# Patient Record
Sex: MALE | Race: WHITE | NOT HISPANIC OR LATINO | Employment: FULL TIME | URBAN - METROPOLITAN AREA
[De-identification: names, ages, dates, MRNs, and addresses within clinical notes are randomized per-mention and may not be internally consistent; named-entity substitution may affect disease eponyms.]

---

## 2020-03-06 ENCOUNTER — OFFICE VISIT (OUTPATIENT)
Dept: INTERNAL MEDICINE | Facility: CLINIC | Age: 45
End: 2020-03-06

## 2020-03-06 VITALS
RESPIRATION RATE: 14 BRPM | BODY MASS INDEX: 40.37 KG/M2 | WEIGHT: 282 LBS | DIASTOLIC BLOOD PRESSURE: 94 MMHG | SYSTOLIC BLOOD PRESSURE: 154 MMHG | HEIGHT: 70 IN

## 2020-03-06 DIAGNOSIS — Z00.00 PHYSICAL EXAM: Primary | ICD-10-CM

## 2020-03-06 DIAGNOSIS — I10 ESSENTIAL HYPERTENSION: ICD-10-CM

## 2020-03-06 PROCEDURE — 99386 PREV VISIT NEW AGE 40-64: CPT | Performed by: NURSE PRACTITIONER

## 2020-03-06 RX ORDER — LOSARTAN POTASSIUM 50 MG/1
50 TABLET ORAL DAILY
Qty: 30 TABLET | Refills: 1 | Status: SHIPPED | OUTPATIENT
Start: 2020-03-06 | End: 2020-04-03 | Stop reason: SDUPTHER

## 2020-03-08 NOTE — PROGRESS NOTES
Andrew Pulido is a 45 y.o. male who presents to [facility] care and for an annual physical exam. He is concerned about elevated blood pressure.    He presents to [facility] care and due to elevated blood pressure which was noticed a recent biometric screening at [facility]. He c/o BP readings consistently >140/90 with intermittent episodes of headaches and feeling lightheaded. Denies chest pain or shortness of breath.  He is currently using a CPAP nightly for mgmt of sleep apnea which he tolerates well.       The following portions of the patient's history were reviewed and updated as appropriate: allergies, current medications, past social history and problem list.    Past Medical History:   Diagnosis Date   • Allergic rhinitis    • Anxiety     Anxiety/ Stress   • Bundle branch block     Right Bundle Branch Block on EKG   • Fatigue     Fatigue/Lethargy   • Gastroesophageal reflux    • Hiatal hernia    • History of EKG 06/21/2010   • Insomnia    • Low back pain     with sciatic component to right leg   • Low back strain    • Morbid obesity (CMS/HCC)    • Palpitation    • Rotator cuff tear, right    • Syncopal episodes     x 3 due to overheating at [facility]   • Vasovagal episode     Vasovagal episodes   • Weight loss     Intentional weight loss with Nutrasystem         Current Outpatient Medications:   •  losartan (COZAAR) 50 MG tablet, Take 1 tablet by mouth Daily., Disp: 30 tablet, Rfl: 1    Allergies   Allergen Reactions   • Aspirin        Review of Systems   Constitutional: Negative for activity change, appetite change, chills, diaphoresis, fatigue, fever and unexpected weight change.   HENT: Negative for congestion, dental problem, drooling, ear discharge, ear pain, facial swelling, hearing loss, mouth sores, nosebleeds, postnasal drip, rhinorrhea, sinus pressure, sore throat, tinnitus and trouble swallowing.    Eyes: Positive for visual disturbance (wears glasses, no visual changes).  "Negative for photophobia, pain, discharge, redness and itching.   Respiratory: Negative for apnea, cough, choking, chest tightness, shortness of breath and wheezing.    Cardiovascular: Negative for chest pain, palpitations and leg swelling.        No orthopnea, PND, OBANDO   Gastrointestinal: Positive for diarrhea. Negative for abdominal pain, blood in stool, constipation, nausea and vomiting.        C/o intermittent loose stools (thu 1x/week) since his cholecystectomy   Endocrine: Positive for polydipsia. Negative for cold intolerance, heat intolerance and polyuria.   Genitourinary: Negative for decreased urine volume, dysuria, enuresis, flank pain, frequency, hematuria and urgency.   Musculoskeletal: Positive for arthralgias and back pain. Negative for gait problem, joint swelling, myalgias, neck pain and neck stiffness.        He c/o low back pain 7/2019, xray by chiro showed DDD-his sx are well-controlled with monthly visits to chiro   Skin: Negative for color change and rash.        No hair changes, no nail changes   Allergic/Immunologic: Negative for environmental allergies, food allergies and immunocompromised state.   Neurological: Negative for dizziness, tremors, seizures, syncope, speech difficulty, weakness, light-headedness, numbness and headaches.   Hematological: Negative for adenopathy. Does not bruise/bleed easily.   Psychiatric/Behavioral: Positive for sleep disturbance (intermittent episodes of difficulty sleeping). Negative for agitation, confusion, decreased concentration, dysphoric mood and suicidal ideas. The patient is nervous/anxious (intermittently).        Objective   Vitals:    03/06/20 1058 03/06/20 1144   BP: 132/80 154/94   BP Location: Left arm Left arm   Patient Position: Sitting Sitting   Cuff Size: Adult    Resp: 14    Weight: 128 kg (282 lb)    Height: 177.8 cm (70\")      Body mass index is 40.46 kg/m².  Physical Exam   Constitutional: He is oriented to person, place, and time. He " appears well-developed and well-nourished. No distress.   HENT:   Right Ear: Hearing, tympanic membrane, external ear and ear canal normal.   Left Ear: Hearing, tympanic membrane, external ear and ear canal normal.   Nose: Right sinus exhibits no maxillary sinus tenderness and no frontal sinus tenderness. Left sinus exhibits no maxillary sinus tenderness and no frontal sinus tenderness.   Eyes: Pupils are equal, round, and reactive to light. Conjunctivae, EOM and lids are normal.   Neck: Trachea normal and phonation normal. Neck supple. Normal carotid pulses and no JVD present. Carotid bruit is not present. No thyroid mass and no thyromegaly present.   Cardiovascular: Normal rate, regular rhythm, S1 normal and S2 normal. PMI is not displaced. Exam reveals no gallop and no friction rub.   No murmur heard.  Pulses:       Carotid pulses are 2+ on the right side, and 2+ on the left side.       Radial pulses are 2+ on the right side, and 2+ on the left side.        Dorsalis pedis pulses are 2+ on the right side, and 2+ on the left side.   Pulmonary/Chest: Effort normal and breath sounds normal. He has no wheezes. He has no rhonchi. He has no rales. Chest wall is not dull to percussion.   Abdominal: Soft. Normal appearance, normal aorta and bowel sounds are normal. He exhibits no abdominal bruit and no mass. There is no hepatosplenomegaly. There is no tenderness.   Musculoskeletal: Normal range of motion. He exhibits no edema or tenderness.   Lymphadenopathy:     He has no cervical adenopathy.        Right: No supraclavicular adenopathy present.        Left: No supraclavicular adenopathy present.   Neurological: He is alert and oriented to person, place, and time. He has normal strength and normal reflexes. No cranial nerve deficit or sensory deficit. Coordination normal.   Skin: Skin is warm and dry. No rash noted.   Psychiatric: He has a normal mood and affect. His speech is normal and behavior is normal. Judgment and  thought content normal. Cognition and memory are normal. He is attentive.   Nursing note and vitals reviewed.      Assessment/Plan   Darren was seen today for establish care, hypertension and hyperglycemia.    Diagnoses and all orders for this visit:    Physical exam  -     Comprehensive Metabolic Panel  -     Lipid Panel  -     TSH  -     Urinalysis With Microscopic If Indicated (No Culture) - Urine, Clean Catch    Essential hypertension  -     losartan (COZAAR) 50 MG tablet; Take 1 tablet by mouth Daily.    Risk assessment:  He has a family hx (father) of DM2 and c/o increased thirst, check fasting labs today. Discussed benefits of exercise and weight loss to reduce risk of DM and HTN (BMI elevated at 40.5).  He also has a family hx (mother and father) of HTN with recent elevated readings, will start Losartan and monitor BP at home. Re-evaluate in 3-4 weeks.    Prevention:  He has received his annual flu vaccine. Tdap is due.  He receives annual eye exams, denies recent visual changes.

## 2020-03-13 LAB
ALBUMIN SERPL-MCNC: 4.5 G/DL (ref 3.5–5.2)
ALBUMIN/GLOB SERPL: 1.5 G/DL
ALP SERPL-CCNC: 71 U/L (ref 39–117)
ALT SERPL-CCNC: 30 U/L (ref 1–41)
APPEARANCE UR: CLEAR
AST SERPL-CCNC: 8 U/L (ref 1–40)
BILIRUB SERPL-MCNC: 0.6 MG/DL (ref 0.2–1.2)
BILIRUB UR QL STRIP: NEGATIVE
BUN SERPL-MCNC: 14 MG/DL (ref 6–20)
BUN/CREAT SERPL: 16.5 (ref 7–25)
CALCIUM SERPL-MCNC: 9.2 MG/DL (ref 8.6–10.5)
CHLORIDE SERPL-SCNC: 96 MMOL/L (ref 98–107)
CHOLEST SERPL-MCNC: 210 MG/DL (ref 0–200)
CO2 SERPL-SCNC: 22.8 MMOL/L (ref 22–29)
COLOR UR: YELLOW
CREAT SERPL-MCNC: 0.85 MG/DL (ref 0.76–1.27)
GLOBULIN SER CALC-MCNC: 3 GM/DL
GLUCOSE SERPL-MCNC: 343 MG/DL (ref 65–99)
GLUCOSE UR QL: ABNORMAL
HDLC SERPL-MCNC: 45 MG/DL (ref 40–60)
HGB UR QL STRIP: NEGATIVE
KETONES UR QL STRIP: ABNORMAL
LDLC SERPL CALC-MCNC: 145 MG/DL (ref 0–100)
LEUKOCYTE ESTERASE UR QL STRIP: NEGATIVE
NITRITE UR QL STRIP: NEGATIVE
PH UR STRIP: <=5 [PH] (ref 5–8)
POTASSIUM SERPL-SCNC: 4.3 MMOL/L (ref 3.5–5.2)
PROT SERPL-MCNC: 7.5 G/DL (ref 6–8.5)
PROT UR QL STRIP: NEGATIVE
SODIUM SERPL-SCNC: 132 MMOL/L (ref 136–145)
SP GR UR: ABNORMAL (ref 1–1.03)
TRIGL SERPL-MCNC: 102 MG/DL (ref 0–150)
TSH SERPL DL<=0.005 MIU/L-ACNC: 1.75 UIU/ML (ref 0.27–4.2)
UROBILINOGEN UR STRIP-MCNC: ABNORMAL MG/DL
VLDLC SERPL CALC-MCNC: 20.4 MG/DL

## 2020-03-13 PROCEDURE — 36415 COLL VENOUS BLD VENIPUNCTURE: CPT | Performed by: NURSE PRACTITIONER

## 2020-03-17 DIAGNOSIS — R73.09 ELEVATED GLUCOSE: Primary | ICD-10-CM

## 2020-03-18 LAB — HBA1C MFR BLD: 10.7 % (ref 4.8–5.6)

## 2020-03-20 LAB — REF LAB TEST METHOD: NORMAL

## 2020-04-03 ENCOUNTER — TELEMEDICINE (OUTPATIENT)
Dept: INTERNAL MEDICINE | Facility: CLINIC | Age: 45
End: 2020-04-03

## 2020-04-03 DIAGNOSIS — E11.9 DIABETES MELLITUS, NEW ONSET (HCC): ICD-10-CM

## 2020-04-03 DIAGNOSIS — E66.01 CLASS 3 SEVERE OBESITY WITH SERIOUS COMORBIDITY AND BODY MASS INDEX (BMI) OF 40.0 TO 44.9 IN ADULT, UNSPECIFIED OBESITY TYPE (HCC): ICD-10-CM

## 2020-04-03 DIAGNOSIS — I10 ESSENTIAL HYPERTENSION: Primary | ICD-10-CM

## 2020-04-03 DIAGNOSIS — E78.5 HYPERLIPIDEMIA, UNSPECIFIED HYPERLIPIDEMIA TYPE: ICD-10-CM

## 2020-04-03 PROCEDURE — 99213 OFFICE O/P EST LOW 20 MIN: CPT | Performed by: NURSE PRACTITIONER

## 2020-04-03 RX ORDER — METFORMIN HYDROCHLORIDE 500 MG/1
500 TABLET, EXTENDED RELEASE ORAL
Qty: 180 TABLET | Refills: 1 | Status: SHIPPED | OUTPATIENT
Start: 2020-04-03 | End: 2020-07-24 | Stop reason: SDUPTHER

## 2020-04-03 RX ORDER — LOSARTAN POTASSIUM 50 MG/1
50 TABLET ORAL DAILY
Qty: 90 TABLET | Refills: 1 | Status: SHIPPED | OUTPATIENT
Start: 2020-04-03 | End: 2020-10-04 | Stop reason: SDUPTHER

## 2020-04-03 NOTE — PROGRESS NOTES
Andrew Pulido is a 45 y.o. male who presents [facility] to follow-up on HTN and new diagnosis of HTN.    He was started on Losartan last month due to elevated blood pressure. He states he is tolerating medication well but is unsure what home BP readings have been. He continues to c/o fatigue throughout the day but believes this may have improved a little.  His recent labs showed an elevated glucose of 343 with a HgB A1c of 10.7 His sodium was also decreased at 132. He does c/o urinary frequency and fatigue but otherwise is feeling well. He does have a family hx (mom and dad) of DM2. His A1c in 2016 was 6.2.    Hypertension   This is a new problem. The current episode started more than 1 month ago. Associated symptoms include malaise/fatigue. Pertinent negatives include no chest pain, headaches, palpitations, peripheral edema or shortness of breath. Risk factors for coronary artery disease include diabetes mellitus. Past treatments include nothing. Current antihypertension treatment includes angiotensin blockers. Improvement on treatment: unsure what home readings have been. There are no compliance problems.         The following portions of the patient's history were reviewed and updated as appropriate: allergies, current medications, past social history and problem list.    Past Medical History:   Diagnosis Date   • Allergic rhinitis    • Anxiety     Anxiety/ Stress   • Bundle branch block     Right Bundle Branch Block on EKG   • Fatigue     Fatigue/Lethargy   • Gastroesophageal reflux    • Hiatal hernia    • History of EKG 06/21/2010   • Insomnia    • Low back pain     with sciatic component to right leg   • Low back strain    • Morbid obesity (CMS/HCC)    • Palpitation    • Rotator cuff tear, right    • Syncopal episodes     x 3 due to overheating at [place] [facility]   • Vasovagal episode     Vasovagal episodes   • Weight loss     Intentional weight loss with Nutrasystem          Current Outpatient Medications:   •  losartan (COZAAR) 50 MG tablet, Take 1 tablet by mouth Daily., Disp: 90 tablet, Rfl: 1  •  Blood Gluc Meter Disp-Strips device, 1 Units Daily., Disp: 1 each, Rfl: 0  •  glucose blood test strip, 1 each by Other route 2 (Two) Times a Day. Use as instructed, Disp: 100 each, Rfl: 12  •  Lancets 30G misc, 1 Units 2 (Two) Times a Day., Disp: 100 each, Rfl: 1  •  metFORMIN ER (GLUCOPHAGE-XR) 500 MG 24 hr tablet, Take 1 tablet by mouth Daily With Breakfast., Disp: 180 tablet, Rfl: 1    Allergies   Allergen Reactions   • Aspirin        Review of Systems   Constitutional: Positive for fatigue and malaise/fatigue. Negative for chills, fever and unexpected weight change.   HENT: Negative for congestion, ear pain, postnasal drip, sinus pressure, sore throat and trouble swallowing.    Eyes: Negative for visual disturbance.   Respiratory: Negative for cough, chest tightness, shortness of breath and wheezing.    Cardiovascular: Negative for chest pain, palpitations and leg swelling.   Gastrointestinal: Negative for abdominal pain, nausea and vomiting.   Genitourinary: Positive for frequency. Negative for dysuria and urgency.   Musculoskeletal: Negative for arthralgias and joint swelling.   Skin: Negative for color change.   Neurological: Negative for syncope, weakness and headaches.       Objective   There were no vitals filed for this visit.  There is no height or weight on file to calculate BMI.  Physical Exam   Constitutional: He appears well-developed and well-nourished. No distress.   HENT:   Right Ear: Hearing normal.   Left Ear: Hearing normal.   Eyes:   Wearing glasses   Psychiatric: He has a normal mood and affect. His behavior is normal. Judgment and thought content normal.       Assessment/Plan   Darren was seen today for diabetes mellitus and hypertension.    Diagnoses and all orders for this visit:    Essential hypertension  -     losartan (COZAAR) 50 MG tablet; Take 1  tablet by mouth Daily.    Diabetes mellitus, new onset (CMS/HCC)  -     metFORMIN ER (GLUCOPHAGE-XR) 500 MG 24 hr tablet; Take 1 tablet by mouth Daily With Breakfast.  -     Blood Gluc Meter Disp-Strips device; 1 Units Daily.  -     Lancets 30G misc; 1 Units 2 (Two) Times a Day.  -     glucose blood test strip; 1 each by Other route 2 (Two) Times a Day. Use as instructed    Hyperlipidemia, unspecified hyperlipidemia type    Class 3 severe obesity with serious comorbidity and body mass index (BMI) of 40.0 to 44.9 in adult, unspecified obesity type (CMS/HCC)    1. He is tolerating Losartan well but is unsure what home BP readings have been, continue medication and check BP.  2. Reviewed recent labs with patient. Discussed new diagnosis of DM2 and a low-carb, low-sugar diet along with regular exercise.  He will begin Metformin and monitor for GI side effects. He is also given a glucometer, advised to contact office with any difficulty using meter. Discussed fasting goal of <110 and 2 hour pp <140-160.  3.  with recent labs, begin statin at next appt (discussed).  4. BMI is 40.5. Discussed benefit of weight loss with patient.    History and medical judgement obtained from questionnaire and from phone conversation with patient and recent labwork. No physical examination was performed.     Approximately 25 minutes spent in reviewing chart and in conversation with patient.

## 2020-04-03 NOTE — PATIENT INSTRUCTIONS
Diabetes Basics    Diabetes (diabetes mellitus) is a long-term (chronic) disease. It occurs when the body does not properly use sugar (glucose) that is released from food after you eat.  Diabetes may be caused by one or both of these problems:  · Your pancreas does not make enough of a hormone called insulin.  · Your body does not react in a normal way to insulin that it makes.  Insulin lets sugars (glucose) go into cells in your body. This gives you energy. If you have diabetes, sugars cannot get into cells. This causes high blood sugar (hyperglycemia).  Follow these instructions at home:  How is diabetes treated?  You may need to take insulin or other diabetes medicines daily to keep your blood sugar in balance. Take your diabetes medicines every day as told by your doctor. List your diabetes medicines here:  Diabetes medicines  · Name of medicine: ______________________________  ? Amount (dose): _______________ Time (a.m./p.m.): _______________ Notes: ___________________________________  · Name of medicine: ______________________________  ? Amount (dose): _______________ Time (a.m./p.m.): _______________ Notes: ___________________________________  · Name of medicine: ______________________________  ? Amount (dose): _______________ Time (a.m./p.m.): _______________ Notes: ___________________________________  If you use insulin, you will learn how to give yourself insulin by injection. You may need to adjust the amount based on the food that you eat. List the types of insulin you use here:  Insulin  · Insulin type: ______________________________  ? Amount (dose): _______________ Time (a.m./p.m.): _______________ Notes: ___________________________________  · Insulin type: ______________________________  ? Amount (dose): _______________ Time (a.m./p.m.): _______________ Notes: ___________________________________  · Insulin type: ______________________________  ? Amount (dose): _______________ Time (a.m./p.m.):  _______________ Notes: ___________________________________  · Insulin type: ______________________________  ? Amount (dose): _______________ Time (a.m./p.m.): _______________ Notes: ___________________________________  · Insulin type: ______________________________  ? Amount (dose): _______________ Time (a.m./p.m.): _______________ Notes: ___________________________________  How do I manage my blood sugar?    Check your blood sugar levels using a blood glucose monitor as directed by your doctor.  Your doctor will set treatment goals for you. Generally, you should have these blood sugar levels:  · Before meals (preprandial):  mg/dL (4.4-7.2 mmol/L).  · After meals (postprandial): below 180 mg/dL (10 mmol/L).  · A1c level: less than 7%.  Write down the times that you will check your blood sugar levels:  Blood sugar checks  · Time: _______________ Notes: ___________________________________  · Time: _______________ Notes: ___________________________________  · Time: _______________ Notes: ___________________________________  · Time: _______________ Notes: ___________________________________  · Time: _______________ Notes: ___________________________________  · Time: _______________ Notes: ___________________________________    What do I need to know about low blood sugar?  Low blood sugar is called hypoglycemia. This is when blood sugar is at or below 70 mg/dL (3.9 mmol/L). Symptoms may include:  · Feeling:  ? Hungry.  ? Worried or nervous (anxious).  ? Sweaty and clammy.  ? Confused.  ? Dizzy.  ? Sleepy.  ? Sick to your stomach (nauseous).  · Having:  ? A fast heartbeat.  ? A headache.  ? A change in your vision.  ? Tingling or no feeling (numbness) around the mouth, lips, or tongue.  ? Jerky movements that you cannot control (seizure).  · Having trouble with:  ? Moving (coordination).  ? Sleeping.  ? Passing out (fainting).  ? Getting upset easily (irritability).  Treating low blood sugar  To treat low blood  sugar, eat or drink something sugary right away. If you can think clearly and swallow safely, follow the 15:15 rule:  · Take 15 grams of a fast-acting carb (carbohydrate). Talk with your doctor about how much you should take.  · Some fast-acting carbs are:  ? Sugar tablets (glucose pills). Take 3-4 glucose pills.  ? 6-8 pieces of hard candy.  ? 4-6 oz (120-150 mL) of fruit juice.  ? 4-6 oz (120-150 mL) of regular (not diet) soda.  ? 1 Tbsp (15 mL) honey or sugar.  · Check your blood sugar 15 minutes after you take the carb.  · If your blood sugar is still at or below 70 mg/dL (3.9 mmol/L), take 15 grams of a carb again.  · If your blood sugar does not go above 70 mg/dL (3.9 mmol/L) after 3 tries, get help right away.  · After your blood sugar goes back to normal, eat a meal or a snack within 1 hour.  Treating very low blood sugar  If your blood sugar is at or below 54 mg/dL (3 mmol/L), you have very low blood sugar (severe hypoglycemia). This is an emergency. Do not wait to see if the symptoms will go away. Get medical help right away. Call your local emergency services (911 in the [place] Do not drive yourself to the hospital.  Questions to ask your health care provider  · Do I need to meet with a diabetes educator?  · What equipment will I need to care for myself at home?  · What diabetes medicines do I need? When should I take them?  · How often do I need to check my blood sugar?  · What number can I call if I have questions?  · When is my next doctor's visit?  · Where can I find a support group for people with diabetes?  Where to find more information  · American Diabetes Association: www.diabetes.org  · American Association of Diabetes Educators: www.diabeteseducator.org/patient-resources  Contact a doctor if:  · Your blood sugar is at or above 240 mg/dL (13.3 mmol/L) for 2 days in a row.  · You have been sick or have had a fever for 2 days or more, and you are not getting better.  · You have any of these  problems for more than 6 hours:  ? You cannot eat or drink.  ? You feel sick to your stomach (nauseous).  ? You throw up (vomit).  ? You have watery poop (diarrhea).  Get help right away if:  · Your blood sugar is lower than 54 mg/dL (3 mmol/L).  · You get confused.  · You have trouble:  ? Thinking clearly.  ? Breathing.  Summary  · Diabetes (diabetes mellitus) is a long-term (chronic) disease. It occurs when the body does not properly use sugar (glucose) that is released from food after digestion.  · Take insulin and diabetes medicines as told.  · Check your blood sugar every day, as often as told.  · Keep all follow-up visits as told by your doctor. This is important.  This information is not intended to replace advice given to you by your health care provider. Make sure you discuss any questions you have with your health care provider.  Document Released: 03/22/2019 Document Revised: 06/10/2019 Document Reviewed: 03/22/2019  [employer] Interactive Patient Education © 2020 [employer]

## 2020-07-10 ENCOUNTER — OFFICE VISIT (OUTPATIENT)
Dept: INTERNAL MEDICINE | Facility: CLINIC | Age: 45
End: 2020-07-10

## 2020-07-10 DIAGNOSIS — E11.9 DIABETES MELLITUS, NEW ONSET (HCC): Primary | ICD-10-CM

## 2020-07-10 DIAGNOSIS — E78.5 HYPERLIPIDEMIA, UNSPECIFIED HYPERLIPIDEMIA TYPE: ICD-10-CM

## 2020-07-10 DIAGNOSIS — I10 ESSENTIAL HYPERTENSION: ICD-10-CM

## 2020-07-10 PROCEDURE — 99442 PR PHYS/QHP TELEPHONE EVALUATION 11-20 MIN: CPT | Performed by: NURSE PRACTITIONER

## 2020-07-10 RX ORDER — ATORVASTATIN CALCIUM 10 MG/1
10 TABLET, FILM COATED ORAL DAILY
Qty: 90 TABLET | Refills: 1 | Status: SHIPPED | OUTPATIENT
Start: 2020-07-10 | End: 2020-10-04 | Stop reason: SDUPTHER

## 2020-07-12 NOTE — PROGRESS NOTES
Andrew Pulido is a 45 y.o. male who presents for a telephone visit to f/u on DM2, HTN and hyperlipidemia.    You have chosen to receive care through a telephone visit. Do you consent to use a telephone visit for your medical care today? Yes    He was started on metformin April 3 due to an elevated A1c of 10.7.  He is tolerating metformin well.  He did noticed loose stools when starting medication which have since resolved.  Unfortunately he has not been checking his glucose and is unsure what his home readings have been.  However, he does notice improvement in fatigue and decreased episodes of dizziness.      Diabetes   Hypoglycemia symptoms include dizziness (intermittent but improved). Pertinent negatives for hypoglycemia include no headaches. Pertinent negatives for diabetes include no chest pain, no fatigue and no weakness.   Hypertension   This is a chronic problem. The current episode started more than 1 year ago. The problem is unchanged. Pertinent negatives include no chest pain, headaches, malaise/fatigue, palpitations, peripheral edema or shortness of breath. Current antihypertension treatment includes angiotensin blockers. The current treatment provides significant improvement. There are no compliance problems.    Hyperlipidemia   Pertinent negatives include no chest pain or shortness of breath.        The following portions of the patient's history were reviewed and updated as appropriate: allergies, current medications, past social history and problem list.    Past Medical History:   Diagnosis Date   • Allergic rhinitis    • Anxiety     Anxiety/ Stress   • Bundle branch block     Right Bundle Branch Block on EKG   • Fatigue     Fatigue/Lethargy   • Gastroesophageal reflux    • Hiatal hernia    • History of EKG 06/21/2010   • Insomnia    • Low back pain     with sciatic component to right leg   • Low back strain    • Morbid obesity (CMS/HCC)    • Palpitation    • Rotator cuff tear, right     • Syncopal episodes     x 3 due to overheating at [facility]   • Vasovagal episode     Vasovagal episodes   • Weight loss     Intentional weight loss with Nutrasystem         Current Outpatient Medications:   •  Blood Gluc Meter Disp-Strips device, 1 Units Daily., Disp: 1 each, Rfl: 0  •  glucose blood test strip, 1 each by Other route 2 (Two) Times a Day. Use as instructed, Disp: 100 each, Rfl: 12  •  Lancets 30G misc, 1 Units 2 (Two) Times a Day., Disp: 100 each, Rfl: 1  •  losartan (COZAAR) 50 MG tablet, Take 1 tablet by mouth Daily., Disp: 90 tablet, Rfl: 1  •  metFORMIN ER (GLUCOPHAGE-XR) 500 MG 24 hr tablet, Take 1 tablet by mouth Daily With Breakfast., Disp: 180 tablet, Rfl: 1  •  atorvastatin (LIPITOR) 10 MG tablet, Take 1 tablet by mouth Daily., Disp: 90 tablet, Rfl: 1    Allergies   Allergen Reactions   • Aspirin        Review of Systems   Constitutional: Negative for chills, fatigue, fever, malaise/fatigue and unexpected weight change.   HENT: Negative for congestion, ear pain, postnasal drip, sinus pressure, sore throat and trouble swallowing.    Eyes: Negative for visual disturbance.   Respiratory: Negative for cough, chest tightness, shortness of breath and wheezing.    Cardiovascular: Negative for chest pain, palpitations and leg swelling.   Gastrointestinal: Negative for abdominal pain, blood in stool, nausea and vomiting.   Genitourinary: Negative for dysuria, frequency and urgency.   Musculoskeletal: Negative for arthralgias and joint swelling.   Skin: Negative for color change.   Neurological: Positive for dizziness (intermittent but improved) and light-headedness. Negative for syncope, weakness and headaches.   Hematological: Does not bruise/bleed easily.       Objective   There were no vitals filed for this visit.  There is no height or weight on file to calculate BMI.  Physical Exam   Psychiatric: He has a normal mood and affect. His behavior is normal. Judgment and thought content  normal.       Assessment/Plan   Darren was seen today for diabetes, hypertension and hyperlipidemia.    Diagnoses and all orders for this visit:    Diabetes mellitus, new onset (CMS/HCC)  -     Hemoglobin A1c  -     Microalbumin / Creatinine Urine Ratio - Urine, Clean Catch    Essential hypertension  -     CBC & Differential    Hyperlipidemia, unspecified hyperlipidemia type  -     Lipid Panel  -     atorvastatin (LIPITOR) 10 MG tablet; Take 1 tablet by mouth Daily.    1.  He is tolerating metformin well but is unsure what his home glucose readings have been.  He will come in for repeat A1c.  He is asked to bring his glucometer with him so we can review this with him.    2.  He is doing well with losartan, unsure what home blood pressure readings have been.  3.  Reviewed lipid panel from March 13 which showed an LDL of 145.  He is agreeable to starting atorvastatin for LDL lowering as well as cardiovascular protection.    History and medical judgement obtained from phone conversation with patient. No physical examination was performed. Approximately 18 minutes spent in phone conversation with patient.

## 2020-07-13 ENCOUNTER — TELEPHONE (OUTPATIENT)
Dept: INTERNAL MEDICINE | Facility: CLINIC | Age: 45
End: 2020-07-13

## 2020-07-13 NOTE — TELEPHONE ENCOUNTER
"Have attempted to call the patient multiple times this morning.  Need to schedule fasting labs for this Friday, July 17, 2020.  Patient does not answer and message says that the \"box is full and cannot leave a message\" at this time.  Will try to call again.  Called  [phone]  "

## 2020-07-18 LAB
ALBUMIN/CREAT UR: 6 MG/G CREAT (ref 0–29)
BASOPHILS # BLD AUTO: 0.07 10*3/MM3 (ref 0–0.2)
BASOPHILS NFR BLD AUTO: 0.7 % (ref 0–1.5)
CHOLEST SERPL-MCNC: 169 MG/DL (ref 0–200)
CREAT UR-MCNC: 180.1 MG/DL
EOSINOPHIL # BLD AUTO: 0.11 10*3/MM3 (ref 0–0.4)
EOSINOPHIL NFR BLD AUTO: 1.1 % (ref 0.3–6.2)
ERYTHROCYTE [DISTWIDTH] IN BLOOD BY AUTOMATED COUNT: 12.2 % (ref 12.3–15.4)
HBA1C MFR BLD: 10.7 % (ref 4.8–5.6)
HCT VFR BLD AUTO: 48.7 % (ref 37.5–51)
HDLC SERPL-MCNC: 41 MG/DL (ref 40–60)
HGB BLD-MCNC: 17 G/DL (ref 13–17.7)
IMM GRANULOCYTES # BLD AUTO: 0.09 10*3/MM3 (ref 0–0.05)
IMM GRANULOCYTES NFR BLD AUTO: 0.9 % (ref 0–0.5)
LDLC SERPL CALC-MCNC: 105 MG/DL (ref 0–100)
LYMPHOCYTES # BLD AUTO: 3.03 10*3/MM3 (ref 0.7–3.1)
LYMPHOCYTES NFR BLD AUTO: 30.1 % (ref 19.6–45.3)
MCH RBC QN AUTO: 30.3 PG (ref 26.6–33)
MCHC RBC AUTO-ENTMCNC: 34.9 G/DL (ref 31.5–35.7)
MCV RBC AUTO: 86.8 FL (ref 79–97)
MICROALBUMIN UR-MCNC: 10.5 UG/ML
MONOCYTES # BLD AUTO: 0.55 10*3/MM3 (ref 0.1–0.9)
MONOCYTES NFR BLD AUTO: 5.5 % (ref 5–12)
NEUTROPHILS # BLD AUTO: 6.21 10*3/MM3 (ref 1.7–7)
NEUTROPHILS NFR BLD AUTO: 61.7 % (ref 42.7–76)
NRBC BLD AUTO-RTO: 0 /100 WBC (ref 0–0.2)
PLATELET # BLD AUTO: 283 10*3/MM3 (ref 140–450)
RBC # BLD AUTO: 5.61 10*6/MM3 (ref 4.14–5.8)
TRIGL SERPL-MCNC: 113 MG/DL (ref 0–150)
VLDLC SERPL CALC-MCNC: 22.6 MG/DL
WBC # BLD AUTO: 10.06 10*3/MM3 (ref 3.4–10.8)

## 2020-07-23 ENCOUNTER — TELEPHONE (OUTPATIENT)
Dept: INTERNAL MEDICINE | Facility: CLINIC | Age: 45
End: 2020-07-23

## 2020-07-23 NOTE — TELEPHONE ENCOUNTER
Was he given information to titrate the dosage as outlined in the result note? If not, please ask Kellee to call him with directions. Thanks.

## 2020-07-23 NOTE — TELEPHONE ENCOUNTER
----- Message from Olive Maria sent at 7/23/2020  3:46 PM EDT -----  Patient returned call.  He was told he needed his Metformin changed due to the results of his A1c.    [place] [address] [address] - [phone] PH - [phone] (Phone)  [phone] (Fax)    [phone]    Thank you,    Olive

## 2020-07-24 DIAGNOSIS — E11.9 DIABETES MELLITUS, NEW ONSET (HCC): ICD-10-CM

## 2020-07-24 RX ORDER — METFORMIN HYDROCHLORIDE 500 MG/1
500 TABLET, EXTENDED RELEASE ORAL 4 TIMES DAILY
Qty: 180 TABLET | Refills: 1 | Status: SHIPPED | OUTPATIENT
Start: 2020-07-24 | End: 2020-07-30 | Stop reason: SDUPTHER

## 2020-07-30 ENCOUNTER — TELEPHONE (OUTPATIENT)
Dept: INTERNAL MEDICINE | Facility: CLINIC | Age: 45
End: 2020-07-30

## 2020-07-30 DIAGNOSIS — E11.9 DIABETES MELLITUS, NEW ONSET (HCC): ICD-10-CM

## 2020-07-30 RX ORDER — METFORMIN HYDROCHLORIDE 500 MG/1
500 TABLET, EXTENDED RELEASE ORAL 4 TIMES DAILY
Qty: 360 TABLET | Refills: 1 | Status: SHIPPED | OUTPATIENT
Start: 2020-07-30 | End: 2020-11-15 | Stop reason: SDUPTHER

## 2020-10-04 DIAGNOSIS — I10 ESSENTIAL HYPERTENSION: ICD-10-CM

## 2020-10-04 DIAGNOSIS — E78.5 HYPERLIPIDEMIA, UNSPECIFIED HYPERLIPIDEMIA TYPE: ICD-10-CM

## 2020-10-08 NOTE — TELEPHONE ENCOUNTER
PATIENT IS CALLING IN HE IS WANTING TO KNOW WHEN THESE PRESCRIPTIONS WILL BE SENT IN, HE REQUESTED THEM ON 10/04/2020.      PLEASE ADVISE    CALLBACK NUMBER:  [phone]       HE STATES IF HE DOES NOT ANSWER CAN YOU LEAVE MESSAGE.    HE STATES HE WILL BE OUT OF [facility] TODAY.

## 2020-10-09 ENCOUNTER — OFFICE VISIT (OUTPATIENT)
Dept: INTERNAL MEDICINE | Facility: CLINIC | Age: 45
End: 2020-10-09

## 2020-10-09 VITALS
WEIGHT: 286.4 LBS | DIASTOLIC BLOOD PRESSURE: 84 MMHG | HEART RATE: 76 BPM | OXYGEN SATURATION: 95 % | HEIGHT: 70 IN | SYSTOLIC BLOOD PRESSURE: 111 MMHG | BODY MASS INDEX: 41 KG/M2

## 2020-10-09 DIAGNOSIS — Z11.59 SCREENING FOR VIRAL DISEASE: ICD-10-CM

## 2020-10-09 DIAGNOSIS — I10 ESSENTIAL HYPERTENSION: ICD-10-CM

## 2020-10-09 DIAGNOSIS — E11.9 DIABETES MELLITUS, NEW ONSET (HCC): Primary | ICD-10-CM

## 2020-10-09 DIAGNOSIS — E11.9 ENCOUNTER FOR DIABETIC FOOT EXAM (HCC): ICD-10-CM

## 2020-10-09 DIAGNOSIS — E78.5 HYPERLIPIDEMIA, UNSPECIFIED HYPERLIPIDEMIA TYPE: ICD-10-CM

## 2020-10-09 DIAGNOSIS — Z23 NEED FOR VACCINATION: ICD-10-CM

## 2020-10-09 PROCEDURE — 90686 IIV4 VACC NO PRSV 0.5 ML IM: CPT | Performed by: NURSE PRACTITIONER

## 2020-10-09 PROCEDURE — 90471 IMMUNIZATION ADMIN: CPT | Performed by: NURSE PRACTITIONER

## 2020-10-09 PROCEDURE — 99214 OFFICE O/P EST MOD 30 MIN: CPT | Performed by: NURSE PRACTITIONER

## 2020-10-09 RX ORDER — LOSARTAN POTASSIUM 50 MG/1
50 TABLET ORAL DAILY
Qty: 90 TABLET | Refills: 1 | Status: SHIPPED | OUTPATIENT
Start: 2020-10-09 | End: 2020-11-01 | Stop reason: SDUPTHER

## 2020-10-09 RX ORDER — ATORVASTATIN CALCIUM 10 MG/1
10 TABLET, FILM COATED ORAL DAILY
Qty: 90 TABLET | Refills: 1 | Status: SHIPPED | OUTPATIENT
Start: 2020-10-09 | End: 2021-01-10 | Stop reason: SDUPTHER

## 2020-10-10 LAB
BUN SERPL-MCNC: 12 MG/DL (ref 6–20)
BUN/CREAT SERPL: 15.8 (ref 7–25)
CALCIUM SERPL-MCNC: 9.3 MG/DL (ref 8.6–10.5)
CHLORIDE SERPL-SCNC: 99 MMOL/L (ref 98–107)
CO2 SERPL-SCNC: 25.3 MMOL/L (ref 22–29)
CREAT SERPL-MCNC: 0.76 MG/DL (ref 0.76–1.27)
GLUCOSE SERPL-MCNC: 258 MG/DL (ref 65–99)
HBA1C MFR BLD: 10.1 % (ref 4.8–5.6)
HCV AB S/CO SERPL IA: <0.1 S/CO RATIO (ref 0–0.9)
POTASSIUM SERPL-SCNC: 4.7 MMOL/L (ref 3.5–5.2)
SODIUM SERPL-SCNC: 136 MMOL/L (ref 136–145)

## 2020-10-11 NOTE — PROGRESS NOTES
Andrew Pulido is a 45 y.o. male who presents for f/u regarding DM2, HTN and hyperlipidemia.    His A1c 7/2020 was 10.7, he was started on Metformin which he is tolerating well. He unfortunately does not check his glucose at home (unsure how to use glucometer) and does not know what glucose readings have been.  He does report feeling better with improved energy level over the past few months. Denies chest pain or shortness of breath.    Hypertension  This is a chronic problem. The current episode started more than 1 year ago. The problem is unchanged. The problem is controlled. Pertinent negatives include no chest pain, headaches, palpitations, peripheral edema or shortness of breath. Current antihypertension treatment includes angiotensin blockers. The current treatment provides significant improvement. There are no compliance problems.    Hyperlipidemia  This is a chronic problem. The current episode started more than 1 year ago. The problem is controlled. Recent lipid tests were reviewed and are low. Exacerbating diseases include diabetes. He has no history of hypothyroidism. Pertinent negatives include no chest pain or shortness of breath. Current antihyperlipidemic treatment includes statins. The current treatment provides significant improvement of lipids. There are no compliance problems.         The following portions of the patient's history were reviewed and updated as appropriate: allergies, current medications, past social history and problem list.    Past Medical History:   Diagnosis Date   • Allergic rhinitis    • Anxiety     Anxiety/ Stress   • Bundle branch block     Right Bundle Branch Block on EKG   • Fatigue     Fatigue/Lethargy   • Gastroesophageal reflux    • Hiatal hernia    • History of EKG 06/21/2010   • Insomnia    • Low back pain     with sciatic component to right leg   • Low back strain    • Morbid obesity (CMS/HCC)    • Palpitation    • Rotator cuff tear, right    •  "Syncopal episodes     x 3 [facility]   • Vasovagal episode     Vasovagal episodes   • Weight loss     Intentional weight loss with Nutrasystem         Current Outpatient Medications:   •  atorvastatin (LIPITOR) 10 MG tablet, Take 1 tablet by mouth Daily., Disp: 90 tablet, Rfl: 1  •  Blood Gluc Meter Disp-Strips device, 1 Units Daily., Disp: 1 each, Rfl: 0  •  glucose blood test strip, 1 each by Other route 2 (Two) Times a Day. Use as instructed, Disp: 100 each, Rfl: 12  •  Lancets 30G misc, 1 Units 2 (Two) Times a Day., Disp: 100 each, Rfl: 1  •  losartan (COZAAR) 50 MG tablet, Take 1 tablet by mouth Daily., Disp: 90 tablet, Rfl: 1  •  metFORMIN ER (GLUCOPHAGE-XR) 500 MG 24 hr tablet, Take 1 tablet by mouth 4 (Four) Times a Day., Disp: 360 tablet, Rfl: 1    Allergies   Allergen Reactions   • Aspirin        Review of Systems   Constitutional: Negative for chills, fatigue, fever and unexpected weight change.   HENT: Positive for congestion and rhinorrhea. Negative for ear pain, postnasal drip, sinus pressure, sore throat and trouble swallowing.    Eyes: Negative for visual disturbance.   Respiratory: Negative for cough, chest tightness, shortness of breath and wheezing.    Cardiovascular: Negative for chest pain, palpitations and leg swelling.   Gastrointestinal: Negative for abdominal pain, blood in stool, nausea and vomiting.   Genitourinary: Negative for dysuria, frequency and urgency.   Musculoskeletal: Negative for arthralgias and joint swelling.   Skin: Negative for color change.   Neurological: Negative for syncope, weakness and headaches.   Hematological: Does not bruise/bleed easily.       Objective   Vitals:    10/09/20 0809   BP: 111/84   BP Location: Right arm   Patient Position: Sitting   Cuff Size: Adult   Pulse: 76   SpO2: 95%   Weight: 130 kg (286 lb 6.4 oz)   Height: 177.8 cm (70\")     Body mass index is 41.09 kg/m².  Physical Exam  Constitutional:       Appearance: He " is well-developed. He is not ill-appearing.   HENT:      Head: Normocephalic.      Right Ear: Hearing, tympanic membrane and external ear normal.      Left Ear: Hearing, tympanic membrane and external ear normal.      Nose: Nose normal. No nasal deformity, mucosal edema or rhinorrhea.      Right Sinus: No maxillary sinus tenderness or frontal sinus tenderness.      Left Sinus: No maxillary sinus tenderness or frontal sinus tenderness.      Mouth/Throat:      Dentition: Normal dentition.   Eyes:      General: Lids are normal.         Right eye: No discharge.         Left eye: No discharge.      Conjunctiva/sclera: Conjunctivae normal.      Right eye: No exudate.     Left eye: No exudate.  Neck:      Musculoskeletal: Normal range of motion. No edema.      Thyroid: No thyroid mass or thyromegaly.      Vascular: No carotid bruit.      Trachea: Trachea normal.   Cardiovascular:      Rate and Rhythm: Regular rhythm.      Pulses:           Dorsalis pedis pulses are 3+ on the right side and 3+ on the left side.        Posterior tibial pulses are 3+ on the right side and 3+ on the left side.      Heart sounds: Normal heart sounds. No murmur.   Pulmonary:      Effort: No respiratory distress.      Breath sounds: Normal breath sounds. No decreased breath sounds, wheezing, rhonchi or rales.   Abdominal:      General: Bowel sounds are normal.      Palpations: Abdomen is soft.      Tenderness: There is no abdominal tenderness.   Musculoskeletal:      Right foot: Normal range of motion.      Left foot: Normal range of motion.   Feet:      Right foot:      Protective Sensation: 10 sites tested. 10 sites sensed.      Skin integrity: Skin integrity normal.      Toenail Condition: Right toenails are normal.      Left foot:      Protective Sensation: 10 sites tested. 10 sites sensed.      Skin integrity: Skin integrity normal.      Toenail Condition: Left toenails are normal.   Lymphadenopathy:      Head:      Right side of head: No  submental, submandibular, tonsillar, preauricular, posterior auricular or occipital adenopathy.      Left side of head: No submental, submandibular, tonsillar, preauricular, posterior auricular or occipital adenopathy.   Skin:     General: Skin is warm and dry.      Nails: There is no clubbing.     Neurological:      Mental Status: He is alert.   Psychiatric:         Behavior: Behavior is cooperative.         Assessment/Plan   Darren was seen today for diabetes, hypertension and hyperlipidemia.    Diagnoses and all orders for this visit:    Diabetes mellitus, new onset (CMS/HCC)  -     Hemoglobin A1c    Essential hypertension  -     Basic Metabolic Panel    Hyperlipidemia, unspecified hyperlipidemia type    Screening for viral disease  -     Hepatitis C Antibody    Need for vaccination  -     Fluarix Quad >6 Months (VFC) ([id number])    Encounter for diabetic foot exam (CMS/[facility])    1. DM2-he is tolerating Metformin well, recheck A1c and titrate medication as needed. He is encouraged to bring his glucometer to his next visit so we can demonstrate proper usage.  2. HTN-BP is excellent in the office, continue current medications along with a low-sodium diet.  3. Hyperlipidemia-he is on statin therapy for cardiovascular protection which he is tolerating without myalgias.

## 2020-10-14 DIAGNOSIS — E11.9 DIABETES MELLITUS, NEW ONSET (HCC): Primary | ICD-10-CM

## 2020-10-14 RX ORDER — PIOGLITAZONEHYDROCHLORIDE 15 MG/1
15 TABLET ORAL DAILY
Qty: 90 TABLET | Refills: 1 | Status: SHIPPED | OUTPATIENT
Start: 2020-10-14 | End: 2021-01-10 | Stop reason: SDUPTHER

## 2020-11-01 DIAGNOSIS — I10 ESSENTIAL HYPERTENSION: ICD-10-CM

## 2020-11-02 RX ORDER — LOSARTAN POTASSIUM 50 MG/1
50 TABLET ORAL DAILY
Qty: 90 TABLET | Refills: 1 | Status: SHIPPED | OUTPATIENT
Start: 2020-11-02 | End: 2021-01-29 | Stop reason: SDUPTHER

## 2020-11-15 DIAGNOSIS — E11.9 DIABETES MELLITUS, NEW ONSET (HCC): ICD-10-CM

## 2020-11-16 RX ORDER — METFORMIN HYDROCHLORIDE 500 MG/1
500 TABLET, EXTENDED RELEASE ORAL 4 TIMES DAILY
Qty: 360 TABLET | Refills: 1 | Status: SHIPPED | OUTPATIENT
Start: 2020-11-16 | End: 2021-02-19 | Stop reason: SDUPTHER

## 2021-01-10 DIAGNOSIS — E11.9 DIABETES MELLITUS, NEW ONSET (HCC): ICD-10-CM

## 2021-01-10 DIAGNOSIS — E78.5 HYPERLIPIDEMIA, UNSPECIFIED HYPERLIPIDEMIA TYPE: ICD-10-CM

## 2021-01-10 RX ORDER — PIOGLITAZONEHYDROCHLORIDE 15 MG/1
15 TABLET ORAL DAILY
Qty: 90 TABLET | Refills: 1 | Status: SHIPPED | OUTPATIENT
Start: 2021-01-10 | End: 2021-04-11 | Stop reason: SDUPTHER

## 2021-01-10 RX ORDER — ATORVASTATIN CALCIUM 10 MG/1
10 TABLET, FILM COATED ORAL DAILY
Qty: 90 TABLET | Refills: 1 | Status: SHIPPED | OUTPATIENT
Start: 2021-01-10 | End: 2021-04-04 | Stop reason: SDUPTHER

## 2021-01-29 ENCOUNTER — OFFICE VISIT (OUTPATIENT)
Dept: INTERNAL MEDICINE | Facility: CLINIC | Age: 46
End: 2021-01-29

## 2021-01-29 VITALS
SYSTOLIC BLOOD PRESSURE: 124 MMHG | HEART RATE: 83 BPM | OXYGEN SATURATION: 98 % | DIASTOLIC BLOOD PRESSURE: 82 MMHG | WEIGHT: 299 LBS | TEMPERATURE: 98.3 F | BODY MASS INDEX: 42.8 KG/M2 | HEIGHT: 70 IN

## 2021-01-29 DIAGNOSIS — E78.00 HYPERCHOLESTEREMIA: ICD-10-CM

## 2021-01-29 DIAGNOSIS — E11.65 UNCONTROLLED TYPE 2 DIABETES MELLITUS WITH HYPERGLYCEMIA (HCC): Primary | ICD-10-CM

## 2021-01-29 DIAGNOSIS — I10 ESSENTIAL HYPERTENSION: ICD-10-CM

## 2021-01-29 DIAGNOSIS — E66.01 MORBIDLY OBESE (HCC): ICD-10-CM

## 2021-01-29 LAB
ALBUMIN SERPL-MCNC: 4.4 G/DL (ref 3.5–5.2)
ALBUMIN/GLOB SERPL: 1.6 G/DL
ALP SERPL-CCNC: 52 U/L (ref 39–117)
ALT SERPL-CCNC: 31 U/L (ref 1–41)
AST SERPL-CCNC: 22 U/L (ref 1–40)
BILIRUB SERPL-MCNC: 0.5 MG/DL (ref 0–1.2)
BUN SERPL-MCNC: 15 MG/DL (ref 6–20)
BUN/CREAT SERPL: 18.3 (ref 7–25)
CALCIUM SERPL-MCNC: 9.6 MG/DL (ref 8.6–10.5)
CHLORIDE SERPL-SCNC: 101 MMOL/L (ref 98–107)
CHOLEST SERPL-MCNC: 157 MG/DL (ref 0–200)
CO2 SERPL-SCNC: 26.4 MMOL/L (ref 22–29)
CREAT SERPL-MCNC: 0.82 MG/DL (ref 0.76–1.27)
GLOBULIN SER CALC-MCNC: 2.7 GM/DL
GLUCOSE SERPL-MCNC: 174 MG/DL (ref 65–99)
HBA1C MFR BLD: 9.5 % (ref 4.8–5.6)
HDLC SERPL-MCNC: 47 MG/DL (ref 40–60)
LDLC SERPL CALC-MCNC: 90 MG/DL (ref 0–100)
POTASSIUM SERPL-SCNC: 4.6 MMOL/L (ref 3.5–5.2)
PROT SERPL-MCNC: 7.1 G/DL (ref 6–8.5)
SODIUM SERPL-SCNC: 139 MMOL/L (ref 136–145)
TRIGL SERPL-MCNC: 109 MG/DL (ref 0–150)
VLDLC SERPL CALC-MCNC: 20 MG/DL (ref 5–40)

## 2021-01-29 PROCEDURE — 99214 OFFICE O/P EST MOD 30 MIN: CPT | Performed by: NURSE PRACTITIONER

## 2021-01-29 RX ORDER — LOSARTAN POTASSIUM 50 MG/1
50 TABLET ORAL DAILY
Qty: 90 TABLET | Refills: 2 | Status: SHIPPED | OUTPATIENT
Start: 2021-01-29 | End: 2021-01-30 | Stop reason: SDUPTHER

## 2021-01-29 NOTE — PROGRESS NOTES
"Chief Complaint  Diabetes, Hypertension, and Hyperlipidemia    Subjective          Darren Pulido presents to [facility] for f/u regarding DM2, HTN and hyperlipidemia.    He was started on Actos 10/2020 due to elevated A1c (10.1) which he is tolerating well, does not check glucose at home so he is unaware what his readings have been.  He was started on Atorvastatin for cardiovascular protection which he is tolerating well, denies myalgias.    Hypertension  This is a recurrent problem. The current episode started more than 1 year ago. The problem has been gradually improving since onset. The problem is controlled. Associated symptoms include malaise/fatigue, shortness of breath and sweats. Pertinent negatives include no anxiety, blurred vision, chest pain, headaches, neck pain, orthopnea, palpitations, peripheral edema or PND. There are no associated agents to hypertension. Risk factors for coronary artery disease include diabetes mellitus and obesity. Compliance problems include diet and exercise.        Objective   Vital Signs:   /82 (BP Location: Left arm, Patient Position: Sitting, Cuff Size: Adult)   Pulse 83   Temp 98.3 °F (36.8 °C) (Oral)   Ht 177.8 cm (70\")   Wt 136 kg (299 lb)   SpO2 98%   BMI 42.90 kg/m²     Physical Exam  Constitutional:       Appearance: He is well-developed. He is not ill-appearing.   HENT:      Head: Normocephalic.      Right Ear: Hearing, tympanic membrane and external ear normal.      Left Ear: Hearing, tympanic membrane and external ear normal.      Nose: Nose normal. No nasal deformity, mucosal edema or rhinorrhea.      Right Sinus: No maxillary sinus tenderness or frontal sinus tenderness.      Left Sinus: No maxillary sinus tenderness or frontal sinus tenderness.      Mouth/Throat:      Dentition: Normal dentition.   Eyes:      General: Lids are normal.         Right eye: No discharge.         Left eye: No discharge.      " Conjunctiva/sclera: Conjunctivae normal.      Right eye: No exudate.     Left eye: No exudate.  Neck:      Musculoskeletal: Normal range of motion. No edema.      Thyroid: No thyroid mass or thyromegaly.      Vascular: No carotid bruit.      Trachea: Trachea normal.   Cardiovascular:      Rate and Rhythm: Regular rhythm.      Pulses: Normal pulses.      Heart sounds: Normal heart sounds. No murmur.   Pulmonary:      Effort: No respiratory distress.      Breath sounds: Normal breath sounds. No decreased breath sounds, wheezing, rhonchi or rales.   Abdominal:      General: Bowel sounds are normal.      Palpations: Abdomen is soft.      Tenderness: There is no abdominal tenderness.   Lymphadenopathy:      Head:      Right side of head: No submental, submandibular, tonsillar, preauricular, posterior auricular or occipital adenopathy.      Left side of head: No submental, submandibular, tonsillar, preauricular, posterior auricular or occipital adenopathy.   Skin:     General: Skin is warm and dry.      Nails: There is no clubbing.     Neurological:      Mental Status: He is alert.   Psychiatric:         Behavior: Behavior is cooperative.        Result Review :   The following data was reviewed by: WADE Black on 01/29/2021:  Common labs    Common Labsle 7/17/20 7/17/20 7/17/20 7/17/20 10/9/20 10/9/20 1/29/21 1/29/21 1/29/21    1201 1201 1201 1201 0909 0909 1016 1016 1016   Glucose      258 (A)  174 (A)    BUN      12  15    Creatinine      0.76  0.82    eGFR Non African Am      111  101    eGFR  Am      134  123    Sodium      136  139    Potassium      4.7  4.6    Chloride      99  101    Calcium      9.3  9.6    Total Protein        7.1    Albumin        4.40    Total Bilirubin        0.5    Alkaline Phosphatase        52    AST (SGOT)        22    ALT (SGPT)        31    WBC  10.06          Hemoglobin  17.0          Hematocrit  48.7          Platelets  283          Total Cholesterol 169        157    Triglycerides 113        109   HDL Cholesterol 41        47   LDL Cholesterol  105 (A)        90   Hemoglobin A1C   10.70 (A)  10.10 (A)  9.50 (A)     Microalbumin, Urine    10.5        (A) Abnormal value       Comments are available for some flowsheets but are not being displayed.                     Assessment and Plan    Problem List Items Addressed This Visit        Cardiac and Vasculature    Hypercholesteremia (Chronic)    Current Assessment & Plan     He is tolerating Atorvastatin without myalgias, recheck lipid panel            Endocrine and Metabolic    Uncontrolled type 2 diabetes mellitus with hyperglycemia (CMS/[facility]) - Primary (Chronic)    Overview     Dx 4/2020, Metformin started; Actos added 10/2020         Current Assessment & Plan     He is unsure what home readings have been but is tolerating Actos, recheck A1c today         Relevant Orders    Hemoglobin A1c (Completed)    Comprehensive Metabolic Panel (Completed)    Lipid Panel (Completed)    Morbidly obese (CMS/[facility]) (Chronic)    Current Assessment & Plan     His weight has increased thu 15 pounds since last visit, discussed diet and exercise program with the goal of weight loss           Other Visit Diagnoses     Essential hypertension        Relevant Medications    losartan (COZAAR) 50 MG tablet      He will check with insurance regarding coverage of screening colonoscopy at age 46 (will place a referral if covered).    He is also advised to schedule an eye exam.      Follow Up   Return in about 3 months (around 4/29/2021).  Patient was given instructions and counseling regarding his condition or for health maintenance advice. Please see specific information pulled into the AVS if appropriate.       Answers for HPI/ROS submitted by the patient on 1/29/2021   Hypertension  What is the primary reason for your visit?: High Blood Pressure

## 2021-01-30 DIAGNOSIS — I10 ESSENTIAL HYPERTENSION: ICD-10-CM

## 2021-01-31 PROBLEM — E78.00 HYPERCHOLESTEREMIA: Status: ACTIVE | Noted: 2021-01-31

## 2021-01-31 PROBLEM — E11.9 DIABETES MELLITUS, NEW ONSET (HCC): Chronic | Status: ACTIVE | Noted: 2020-04-03

## 2021-01-31 PROBLEM — E11.65 UNCONTROLLED TYPE 2 DIABETES MELLITUS WITH HYPERGLYCEMIA: Status: ACTIVE | Noted: 2020-04-03

## 2021-01-31 PROBLEM — E66.01 MORBIDLY OBESE: Chronic | Status: ACTIVE | Noted: 2021-01-31

## 2021-01-31 PROBLEM — E66.01 MORBIDLY OBESE: Status: ACTIVE | Noted: 2021-01-31

## 2021-01-31 PROBLEM — E11.65 UNCONTROLLED TYPE 2 DIABETES MELLITUS WITH HYPERGLYCEMIA (HCC): Chronic | Status: ACTIVE | Noted: 2020-04-03

## 2021-01-31 PROBLEM — E78.00 HYPERCHOLESTEREMIA: Chronic | Status: ACTIVE | Noted: 2021-01-31

## 2021-01-31 RX ORDER — LOSARTAN POTASSIUM 50 MG/1
50 TABLET ORAL DAILY
Qty: 90 TABLET | Refills: 2 | Status: SHIPPED | OUTPATIENT
Start: 2021-01-31 | End: 2021-04-24 | Stop reason: SDUPTHER

## 2021-01-31 NOTE — ASSESSMENT & PLAN NOTE
His weight has increased thu 15 pounds since last visit, discussed diet and exercise program with the goal of weight loss

## 2021-02-04 DIAGNOSIS — E11.65 UNCONTROLLED TYPE 2 DIABETES MELLITUS WITH HYPERGLYCEMIA (HCC): Primary | ICD-10-CM

## 2021-02-04 RX ORDER — EMPAGLIFLOZIN 25 MG/1
1 TABLET, FILM COATED ORAL DAILY
Qty: 30 TABLET | Refills: 3 | Status: SHIPPED | OUTPATIENT
Start: 2021-02-04 | End: 2021-03-07 | Stop reason: SDUPTHER

## 2021-02-19 DIAGNOSIS — E11.9 DIABETES MELLITUS, NEW ONSET (HCC): ICD-10-CM

## 2021-02-19 RX ORDER — METFORMIN HYDROCHLORIDE 500 MG/1
500 TABLET, EXTENDED RELEASE ORAL 4 TIMES DAILY
Qty: 360 TABLET | Refills: 1 | Status: SHIPPED | OUTPATIENT
Start: 2021-02-19 | End: 2021-06-01 | Stop reason: SDUPTHER

## 2021-03-07 DIAGNOSIS — E11.65 UNCONTROLLED TYPE 2 DIABETES MELLITUS WITH HYPERGLYCEMIA (HCC): ICD-10-CM

## 2021-03-08 RX ORDER — EMPAGLIFLOZIN 25 MG/1
1 TABLET, FILM COATED ORAL DAILY
Qty: 90 TABLET | Refills: 1 | Status: SHIPPED | OUTPATIENT
Start: 2021-03-08 | End: 2021-06-05 | Stop reason: SDUPTHER

## 2021-04-02 ENCOUNTER — BULK ORDERING (OUTPATIENT)
Dept: CASE MANAGEMENT | Facility: OTHER | Age: 46
End: 2021-04-02

## 2021-04-02 DIAGNOSIS — Z23 IMMUNIZATION DUE: ICD-10-CM

## 2021-04-04 DIAGNOSIS — E78.5 HYPERLIPIDEMIA, UNSPECIFIED HYPERLIPIDEMIA TYPE: ICD-10-CM

## 2021-04-05 RX ORDER — ATORVASTATIN CALCIUM 10 MG/1
10 TABLET, FILM COATED ORAL DAILY
Qty: 90 TABLET | Refills: 1 | Status: SHIPPED | OUTPATIENT
Start: 2021-04-05 | End: 2021-07-03 | Stop reason: SDUPTHER

## 2021-04-11 DIAGNOSIS — E11.9 DIABETES MELLITUS, NEW ONSET (HCC): ICD-10-CM

## 2021-04-12 RX ORDER — PIOGLITAZONEHYDROCHLORIDE 15 MG/1
15 TABLET ORAL DAILY
Qty: 90 TABLET | Refills: 0 | Status: SHIPPED | OUTPATIENT
Start: 2021-04-12 | End: 2021-07-17 | Stop reason: SDUPTHER

## 2021-04-24 DIAGNOSIS — I10 ESSENTIAL HYPERTENSION: ICD-10-CM

## 2021-04-26 RX ORDER — LOSARTAN POTASSIUM 50 MG/1
50 TABLET ORAL DAILY
Qty: 90 TABLET | Refills: 1 | Status: SHIPPED | OUTPATIENT
Start: 2021-04-26 | End: 2021-07-24 | Stop reason: SDUPTHER

## 2021-04-30 ENCOUNTER — OFFICE VISIT (OUTPATIENT)
Dept: INTERNAL MEDICINE | Facility: CLINIC | Age: 46
End: 2021-04-30

## 2021-04-30 VITALS
TEMPERATURE: 97.7 F | DIASTOLIC BLOOD PRESSURE: 84 MMHG | BODY MASS INDEX: 40.8 KG/M2 | WEIGHT: 285 LBS | HEART RATE: 92 BPM | HEIGHT: 70 IN | OXYGEN SATURATION: 98 % | SYSTOLIC BLOOD PRESSURE: 138 MMHG

## 2021-04-30 DIAGNOSIS — I10 HYPERTENSION, ESSENTIAL: Chronic | ICD-10-CM

## 2021-04-30 DIAGNOSIS — E11.65 UNCONTROLLED TYPE 2 DIABETES MELLITUS WITH HYPERGLYCEMIA (HCC): Primary | ICD-10-CM

## 2021-04-30 DIAGNOSIS — E78.00 HYPERCHOLESTEREMIA: Chronic | ICD-10-CM

## 2021-04-30 LAB
ALBUMIN SERPL-MCNC: 4.8 G/DL (ref 3.5–5.2)
ALBUMIN/GLOB SERPL: 1.8 G/DL
ALP SERPL-CCNC: 54 U/L (ref 39–117)
ALT SERPL-CCNC: 31 U/L (ref 1–41)
AST SERPL-CCNC: 18 U/L (ref 1–40)
BILIRUB SERPL-MCNC: 0.7 MG/DL (ref 0–1.2)
BUN SERPL-MCNC: 16 MG/DL (ref 6–20)
BUN/CREAT SERPL: 18.4 (ref 7–25)
CALCIUM SERPL-MCNC: 10.1 MG/DL (ref 8.6–10.5)
CHLORIDE SERPL-SCNC: 102 MMOL/L (ref 98–107)
CO2 SERPL-SCNC: 23.5 MMOL/L (ref 22–29)
CREAT SERPL-MCNC: 0.87 MG/DL (ref 0.76–1.27)
GLOBULIN SER CALC-MCNC: 2.6 GM/DL
GLUCOSE SERPL-MCNC: 139 MG/DL (ref 65–99)
HBA1C MFR BLD: 7.6 % (ref 4.8–5.6)
POTASSIUM SERPL-SCNC: 4.6 MMOL/L (ref 3.5–5.2)
PROT SERPL-MCNC: 7.4 G/DL (ref 6–8.5)
SODIUM SERPL-SCNC: 141 MMOL/L (ref 136–145)

## 2021-04-30 PROCEDURE — 99214 OFFICE O/P EST MOD 30 MIN: CPT | Performed by: NURSE PRACTITIONER

## 2021-05-01 NOTE — ASSESSMENT & PLAN NOTE
Discussed low-carb, low-sugar diet. Recheck A1c today since addition of Jardiance.  He is also overdue for a diabetic eye exam which he will schedule.

## 2021-05-30 ENCOUNTER — PATIENT MESSAGE (OUTPATIENT)
Dept: INTERNAL MEDICINE | Facility: CLINIC | Age: 46
End: 2021-05-30

## 2021-05-30 DIAGNOSIS — E11.9 DIABETES MELLITUS, NEW ONSET (HCC): ICD-10-CM

## 2021-06-01 RX ORDER — METFORMIN HYDROCHLORIDE 500 MG/1
500 TABLET, EXTENDED RELEASE ORAL 4 TIMES DAILY
Qty: 360 TABLET | Refills: 1 | Status: SHIPPED | OUTPATIENT
Start: 2021-06-01 | End: 2021-09-05 | Stop reason: SDUPTHER

## 2021-06-01 NOTE — TELEPHONE ENCOUNTER
From: Darren Pulido  To: WADE Black  Sent: 5/30/2021 9:44 AM EDT  Subject: Prescription Question    I tried to request a refill for my Metformin through [email or web address] and it said I couldn't. It appears all my other medications don't have this problem. And I have enough of them to last at least 2 weeks presently but this one will run out in about 10 days. I am hoping this is a temporary glitch with the thu but could you please send in a refill request for this? Thanks!

## 2021-06-05 DIAGNOSIS — E11.65 UNCONTROLLED TYPE 2 DIABETES MELLITUS WITH HYPERGLYCEMIA (HCC): ICD-10-CM

## 2021-06-07 RX ORDER — EMPAGLIFLOZIN 25 MG/1
1 TABLET, FILM COATED ORAL DAILY
Qty: 90 TABLET | Refills: 1 | Status: SHIPPED | OUTPATIENT
Start: 2021-06-07 | End: 2021-09-05 | Stop reason: SDUPTHER

## 2021-07-03 DIAGNOSIS — E78.5 HYPERLIPIDEMIA, UNSPECIFIED HYPERLIPIDEMIA TYPE: ICD-10-CM

## 2021-07-06 RX ORDER — ATORVASTATIN CALCIUM 10 MG/1
10 TABLET, FILM COATED ORAL DAILY
Qty: 90 TABLET | Refills: 1 | Status: SHIPPED | OUTPATIENT
Start: 2021-07-06 | End: 2021-10-03 | Stop reason: SDUPTHER

## 2021-07-17 DIAGNOSIS — E11.9 DIABETES MELLITUS, NEW ONSET (HCC): ICD-10-CM

## 2021-07-19 RX ORDER — PIOGLITAZONEHYDROCHLORIDE 15 MG/1
15 TABLET ORAL DAILY
Qty: 90 TABLET | Refills: 1 | Status: SHIPPED | OUTPATIENT
Start: 2021-07-19 | End: 2021-10-17 | Stop reason: SDUPTHER

## 2021-07-24 DIAGNOSIS — I10 ESSENTIAL HYPERTENSION: ICD-10-CM

## 2021-07-26 RX ORDER — LOSARTAN POTASSIUM 50 MG/1
50 TABLET ORAL DAILY
Qty: 90 TABLET | Refills: 0 | Status: SHIPPED | OUTPATIENT
Start: 2021-07-26 | End: 2021-10-31 | Stop reason: SDUPTHER

## 2021-08-17 ENCOUNTER — TELEMEDICINE (OUTPATIENT)
Dept: INTERNAL MEDICINE | Facility: CLINIC | Age: 46
End: 2021-08-17

## 2021-08-17 DIAGNOSIS — E78.00 HYPERCHOLESTEREMIA: Chronic | ICD-10-CM

## 2021-08-17 DIAGNOSIS — I10 HYPERTENSION, ESSENTIAL: Chronic | ICD-10-CM

## 2021-08-17 DIAGNOSIS — E11.65 UNCONTROLLED TYPE 2 DIABETES MELLITUS WITH HYPERGLYCEMIA (HCC): Primary | Chronic | ICD-10-CM

## 2021-08-17 PROCEDURE — 99213 OFFICE O/P EST LOW 20 MIN: CPT | Performed by: NURSE PRACTITIONER

## 2021-08-17 NOTE — PROGRESS NOTES
Andrew Pulido is a 46 y.o. male.         History of Present Illness     The following portions of the patient's history were reviewed and updated as appropriate: allergies, current medications, past social history and problem list.    Past Medical History:   Diagnosis Date   • Allergic rhinitis    • Anxiety     Anxiety/ Stress   • Bundle branch block     Right Bundle Branch Block on EKG   • Fatigue     Fatigue/Lethargy   • Gastroesophageal reflux    • Hiatal hernia    • History of EKG 06/21/2010   • Insomnia    • Low back pain     with sciatic component to right leg   • Low back strain    • Morbid obesity (CMS/HCC)    • Palpitation    • Rotator cuff tear, right    • Syncopal episodes     x 3 [facility]   • Vasovagal episode     Vasovagal episodes   • Weight loss     Intentional weight loss with Nutrasystem         Current Outpatient Medications:   •  atorvastatin (LIPITOR) 10 MG tablet, Take 1 tablet by mouth Daily., Disp: 90 tablet, Rfl: 1  •  Blood Gluc Meter Disp-Strips device, 1 Units Daily., Disp: 1 each, Rfl: 0  •  Empagliflozin (Jardiance) 25 MG tablet, Take 25 mg by mouth Daily., Disp: 90 tablet, Rfl: 1  •  glucose blood test strip, 1 each by Other route 2 (Two) Times a Day. Use as instructed, Disp: 100 each, Rfl: 12  •  Lancets 30G misc, 1 Units 2 (Two) Times a Day., Disp: 100 each, Rfl: 1  •  losartan (COZAAR) 50 MG tablet, Take 1 tablet by mouth Daily., Disp: 90 tablet, Rfl: 0  •  metFORMIN ER (GLUCOPHAGE-XR) 500 MG 24 hr tablet, Take 1 tablet by mouth 4 (Four) Times a Day., Disp: 360 tablet, Rfl: 1  •  pioglitazone (Actos) 15 MG tablet, Take 1 tablet by mouth Daily., Disp: 90 tablet, Rfl: 1    Allergies   Allergen Reactions   • Aspirin        Review of Systems    Objective   There were no vitals filed for this visit.  There is no height or weight on file to calculate BMI.  Physical Exam    Assessment/Plan   There are no diagnoses linked to this  encounter.

## 2021-08-22 NOTE — ASSESSMENT & PLAN NOTE
He is tolerating Losartan which is both for BP lowering but also renal protection, continue to monitor.

## 2021-08-22 NOTE — PROGRESS NOTES
Chief Complaint  Diabetes, Hypertension, and Hyperlipidemia    Subjective          Darren Pulido presents to [facility] for f/u regarding DM2, HTN and hyperlipidemia.    His A1c 4/30/2021 was 7.6 which was an improvement from previous (1/2021) labs of 9.5. He is currently managed on Jardiance, Metformin and Pioglitazone which he is tolerating well. He tries to follow a low-carb, low-sugar diet as well.  He has been feeling well, has been under more stress recently with the hospitalization of his wife. His diet has not been as healthy and he has missed several doses of his medication due to his change in routine.    Objective   Vital Signs:   There were no vitals taken for this visit.    Physical Exam  Constitutional:       Appearance: He is well-developed.   HENT:      Head: Normocephalic and atraumatic.   Eyes:      General:         Right eye: No discharge.         Left eye: No discharge.      Conjunctiva/sclera: Conjunctivae normal.   Pulmonary:      Effort: Pulmonary effort is normal.   Neurological:      Mental Status: He is alert and oriented to person, place, and time.   Psychiatric:         Behavior: Behavior normal.         Thought Content: Thought content normal.         Judgment: Judgment normal.        Result Review :   The following data was reviewed by: WADE Black on 08/17/2021:  Common labs    Common Labsle 10/9/20 10/9/20 1/29/21 1/29/21 1/29/21 4/30/21 4/30/21    0909 0909 1016 1016 1016 0938 0938   Glucose  258 (A)  174 (A)   139 (A)   BUN  12  15   16   Creatinine  0.76  0.82   0.87   eGFR Non African Am  111  101   94   eGFR  Am  134  123   114   Sodium  136  139   141   Potassium  4.7  4.6   4.6   Chloride  99  101   102   Calcium  9.3  9.6   10.1   Total Protein    7.1   7.4   Albumin    4.40   4.80   Total Bilirubin    0.5   0.7   Alkaline Phosphatase    52   54   AST (SGOT)    22   18   ALT (SGPT)    31   31   Total Cholesterol     157      Triglycerides     109     HDL Cholesterol     47     LDL Cholesterol      90     Hemoglobin A1C 10.10 (A)  9.50 (A)   7.60 (A)    (A) Abnormal value       Comments are available for some flowsheets but are not being displayed.                     Assessment and Plan    Diagnoses and all orders for this visit:    1. Uncontrolled type 2 diabetes mellitus with hyperglycemia (CMS/[facility]) (Primary)  Assessment & Plan:  His last (4/2021) A1c was improved at 7.6; he will have repeat labs done next week (will  order).    Orders:  -     Hemoglobin A1c  -     Microalbumin / Creatinine Urine Ratio - Urine, Clean Catch    2. Hypertension, essential  Assessment & Plan:  He is tolerating Losartan which is both for BP lowering but also renal protection, continue to monitor.    Orders:  -     Comprehensive Metabolic Panel  -     CBC & Differential    3. Hypercholesteremia  Assessment & Plan:  He denies myalgias with Atorvastatin, recheck lipid panel.    Orders:  -     Lipid Panel    History and medical judgement obtained from video conversation with patient. No hands-on physical examination was performed. Approximately 17 minutes spent in video conversation with patient and chart review.      Follow Up   Return in about 3 months (around 11/17/2021).  Patient was given instructions and counseling regarding his condition or for health maintenance advice. Please see specific information pulled into the AVS if appropriate.

## 2021-09-05 DIAGNOSIS — E11.65 UNCONTROLLED TYPE 2 DIABETES MELLITUS WITH HYPERGLYCEMIA (HCC): ICD-10-CM

## 2021-09-05 DIAGNOSIS — E11.9 DIABETES MELLITUS, NEW ONSET (HCC): ICD-10-CM

## 2021-09-06 RX ORDER — METFORMIN HYDROCHLORIDE 500 MG/1
500 TABLET, EXTENDED RELEASE ORAL 4 TIMES DAILY
Qty: 360 TABLET | Refills: 1 | Status: SHIPPED | OUTPATIENT
Start: 2021-09-06 | End: 2021-12-05 | Stop reason: SDUPTHER

## 2021-10-03 DIAGNOSIS — E78.5 HYPERLIPIDEMIA, UNSPECIFIED HYPERLIPIDEMIA TYPE: ICD-10-CM

## 2021-10-04 RX ORDER — ATORVASTATIN CALCIUM 10 MG/1
10 TABLET, FILM COATED ORAL DAILY
Qty: 90 TABLET | Refills: 1 | Status: SHIPPED | OUTPATIENT
Start: 2021-10-04 | End: 2022-01-09 | Stop reason: SDUPTHER

## 2021-10-17 DIAGNOSIS — E11.9 DIABETES MELLITUS, NEW ONSET (HCC): ICD-10-CM

## 2021-10-18 RX ORDER — PIOGLITAZONEHYDROCHLORIDE 15 MG/1
15 TABLET ORAL DAILY
Qty: 90 TABLET | Refills: 1 | Status: SHIPPED | OUTPATIENT
Start: 2021-10-18 | End: 2022-01-23 | Stop reason: SDUPTHER

## 2021-10-31 DIAGNOSIS — I10 ESSENTIAL HYPERTENSION: ICD-10-CM

## 2021-11-01 RX ORDER — LOSARTAN POTASSIUM 50 MG/1
50 TABLET ORAL DAILY
Qty: 90 TABLET | Refills: 0 | Status: SHIPPED | OUTPATIENT
Start: 2021-11-01 | End: 2022-01-23 | Stop reason: SDUPTHER

## 2021-12-05 DIAGNOSIS — E11.65 UNCONTROLLED TYPE 2 DIABETES MELLITUS WITH HYPERGLYCEMIA (HCC): ICD-10-CM

## 2021-12-05 DIAGNOSIS — E11.9 DIABETES MELLITUS, NEW ONSET (HCC): ICD-10-CM

## 2021-12-07 RX ORDER — METFORMIN HYDROCHLORIDE 500 MG/1
500 TABLET, EXTENDED RELEASE ORAL 4 TIMES DAILY
Qty: 360 TABLET | Refills: 0 | Status: SHIPPED | OUTPATIENT
Start: 2021-12-07 | End: 2022-03-13 | Stop reason: SDUPTHER

## 2021-12-23 ENCOUNTER — OFFICE VISIT (OUTPATIENT)
Dept: INTERNAL MEDICINE | Facility: CLINIC | Age: 46
End: 2021-12-23

## 2021-12-23 VITALS
SYSTOLIC BLOOD PRESSURE: 126 MMHG | BODY MASS INDEX: 39.94 KG/M2 | HEIGHT: 70 IN | DIASTOLIC BLOOD PRESSURE: 84 MMHG | OXYGEN SATURATION: 98 % | HEART RATE: 71 BPM | WEIGHT: 279 LBS | TEMPERATURE: 98.4 F | RESPIRATION RATE: 16 BRPM

## 2021-12-23 DIAGNOSIS — I10 HYPERTENSION, ESSENTIAL: Chronic | ICD-10-CM

## 2021-12-23 DIAGNOSIS — E78.00 HYPERCHOLESTEREMIA: Chronic | ICD-10-CM

## 2021-12-23 DIAGNOSIS — Z00.00 PHYSICAL EXAM: Primary | ICD-10-CM

## 2021-12-23 DIAGNOSIS — E11.65 UNCONTROLLED TYPE 2 DIABETES MELLITUS WITH HYPERGLYCEMIA (HCC): Chronic | ICD-10-CM

## 2021-12-23 PROCEDURE — 99396 PREV VISIT EST AGE 40-64: CPT | Performed by: NURSE PRACTITIONER

## 2021-12-24 LAB
ALBUMIN SERPL-MCNC: 4.6 G/DL (ref 4–5)
ALBUMIN/CREAT UR: <3 MG/G CREAT (ref 0–29)
ALBUMIN/GLOB SERPL: 1.6 {RATIO} (ref 1.2–2.2)
ALP SERPL-CCNC: 54 IU/L (ref 44–121)
ALT SERPL-CCNC: 24 IU/L (ref 0–44)
AST SERPL-CCNC: 20 IU/L (ref 0–40)
BILIRUB SERPL-MCNC: 0.6 MG/DL (ref 0–1.2)
BUN SERPL-MCNC: 14 MG/DL (ref 6–24)
BUN/CREAT SERPL: 18 (ref 9–20)
CALCIUM SERPL-MCNC: 10 MG/DL (ref 8.7–10.2)
CHLORIDE SERPL-SCNC: 101 MMOL/L (ref 96–106)
CHOLEST SERPL-MCNC: 155 MG/DL (ref 100–199)
CO2 SERPL-SCNC: 23 MMOL/L (ref 20–29)
CREAT SERPL-MCNC: 0.78 MG/DL (ref 0.76–1.27)
CREAT UR-MCNC: 88.9 MG/DL
ERYTHROCYTE [DISTWIDTH] IN BLOOD BY AUTOMATED COUNT: 13.1 % (ref 11.6–15.4)
GLOBULIN SER CALC-MCNC: 2.8 G/DL (ref 1.5–4.5)
GLUCOSE SERPL-MCNC: 120 MG/DL (ref 65–99)
HBA1C MFR BLD: 6.9 % (ref 4.8–5.6)
HCT VFR BLD AUTO: 50.3 % (ref 37.5–51)
HDLC SERPL-MCNC: 48 MG/DL
HGB BLD-MCNC: 17.4 G/DL (ref 13–17.7)
LDLC SERPL CALC-MCNC: 88 MG/DL (ref 0–99)
MCH RBC QN AUTO: 29.6 PG (ref 26.6–33)
MCHC RBC AUTO-ENTMCNC: 34.6 G/DL (ref 31.5–35.7)
MCV RBC AUTO: 86 FL (ref 79–97)
MICROALBUMIN UR-MCNC: <3 UG/ML
PLATELET # BLD AUTO: 306 X10E3/UL (ref 150–450)
POTASSIUM SERPL-SCNC: 4.2 MMOL/L (ref 3.5–5.2)
PROT SERPL-MCNC: 7.4 G/DL (ref 6–8.5)
RBC # BLD AUTO: 5.87 X10E6/UL (ref 4.14–5.8)
SODIUM SERPL-SCNC: 139 MMOL/L (ref 134–144)
TRIGL SERPL-MCNC: 103 MG/DL (ref 0–149)
TSH SERPL DL<=0.005 MIU/L-ACNC: 2.18 UIU/ML (ref 0.45–4.5)
VLDLC SERPL CALC-MCNC: 19 MG/DL (ref 5–40)
WBC # BLD AUTO: 10.2 X10E3/UL (ref 3.4–10.8)

## 2021-12-26 NOTE — PROGRESS NOTES
Andrew Pulido is a 46 y.o. male.         History of Present Illness     The following portions of the patient's history were reviewed and updated as appropriate: allergies, current medications, past social history and problem list.    Past Medical History:   Diagnosis Date   • Allergic rhinitis    • Anxiety     Anxiety/ Stress   • Bundle branch block     Right Bundle Branch Block on EKG   • Fatigue     Fatigue/Lethargy   • Gastroesophageal reflux    • Hiatal hernia    • History of EKG 06/21/2010   • Insomnia    • Low back pain     with sciatic component to right leg   • Low back strain    • Morbid obesity (HCC)    • Palpitation    • Rotator cuff tear, right    • Syncopal episodes     x 3 [facility]   • Vasovagal episode     Vasovagal episodes   • Weight loss     Intentional weight loss with [employer]rasystem         Current Outpatient Medications:   •  atorvastatin (LIPITOR) 10 MG tablet, Take 1 tablet by mouth Daily., Disp: 90 tablet, Rfl: 1  •  Blood Gluc Meter Disp-Strips device, 1 Units Daily., Disp: 1 each, Rfl: 0  •  empagliflozin (Jardiance) 25 MG tablet tablet, Take 1 tablet by mouth Daily., Disp: 90 tablet, Rfl: 0  •  glucose blood test strip, 1 each by Other route 2 (Two) Times a Day. Use as instructed, Disp: 100 each, Rfl: 12  •  Lancets 30G misc, 1 Units 2 (Two) Times a Day., Disp: 100 each, Rfl: 1  •  losartan (COZAAR) 50 MG tablet, Take 1 tablet by mouth Daily., Disp: 90 tablet, Rfl: 0  •  metFORMIN ER (GLUCOPHAGE-XR) 500 MG 24 hr tablet, Take 1 tablet by mouth 4 (Four) Times a Day., Disp: 360 tablet, Rfl: 0  •  pioglitazone (Actos) 15 MG tablet, Take 1 tablet by mouth Daily., Disp: 90 tablet, Rfl: 1    Allergies   Allergen Reactions   • Aspirin        Review of Systems    Objective   Vitals:    12/23/21 0953   BP: 126/84   BP Location: Left arm   Patient Position: Sitting   Cuff Size: Adult   Pulse: 71   Resp: 16   Temp: 98.4 °F (36.9 °C)   TempSrc: Temporal  "  SpO2: 98%   Weight: 127 kg (279 lb)   Height: 177.8 cm (70\")     Body mass index is 40.03 kg/m².  Physical Exam    Assessment/Plan   Diagnoses and all orders for this visit:    1. Physical exam (Primary)  -     CBC (No Diff)  -     Comprehensive Metabolic Panel  -     Lipid Panel  -     TSH    2. Uncontrolled type 2 diabetes mellitus with hyperglycemia (HCC)  -     Hemoglobin A1c  -     Microalbumin / Creatinine Urine Ratio - Urine, Clean Catch    3. Hypertension, essential    4. Hypercholesteremia               "

## 2021-12-26 NOTE — PROGRESS NOTES
Andrew Pulido is a 46 y.o. male who presents for a physical exam.    We have slowly titrated his medications over the past year for better glucose control; tolerating Jardiance, Metformin and Actos with improved glucose control. His most recent (4/021) A1c was elevated at 7.6.       The following portions of the patient's history were reviewed and updated as appropriate: allergies, current medications, past social history and problem list.    Past Medical History:   Diagnosis Date   • Allergic rhinitis    • Anxiety     Anxiety/ Stress   • Bundle branch block     Right Bundle Branch Block on EKG   • Fatigue     Fatigue/Lethargy   • Gastroesophageal reflux    • Hiatal hernia    • History of EKG 06/21/2010   • Insomnia    • Low back pain     with sciatic component to right leg   • Low back strain    • Morbid obesity (HCC)    • Palpitation    • Rotator cuff tear, right    • Syncopal episodes     x 3 due to overheating at [facility]   • Vasovagal episode     Vasovagal episodes   • Weight loss     Intentional weight loss with Nutrasystem         Current Outpatient Medications:   •  atorvastatin (LIPITOR) 10 MG tablet, Take 1 tablet by mouth Daily., Disp: 90 tablet, Rfl: 1  •  Blood Gluc Meter Disp-Strips device, 1 Units Daily., Disp: 1 each, Rfl: 0  •  empagliflozin (Jardiance) 25 MG tablet tablet, Take 1 tablet by mouth Daily., Disp: 90 tablet, Rfl: 0  •  glucose blood test strip, 1 each by Other route 2 (Two) Times a Day. Use as instructed, Disp: 100 each, Rfl: 12  •  Lancets 30G misc, 1 Units 2 (Two) Times a Day., Disp: 100 each, Rfl: 1  •  losartan (COZAAR) 50 MG tablet, Take 1 tablet by mouth Daily., Disp: 90 tablet, Rfl: 0  •  metFORMIN ER (GLUCOPHAGE-XR) 500 MG 24 hr tablet, Take 1 tablet by mouth 4 (Four) Times a Day., Disp: 360 tablet, Rfl: 0  •  pioglitazone (Actos) 15 MG tablet, Take 1 tablet by mouth Daily., Disp: 90 tablet, Rfl: 1    Allergies   Allergen Reactions   • Aspirin         Review of Systems   Constitutional: Negative for activity change, appetite change, chills, diaphoresis, fatigue, fever and unexpected weight change.   HENT: Negative for congestion, dental problem, drooling, ear discharge, ear pain, facial swelling, hearing loss, mouth sores, nosebleeds, postnasal drip, rhinorrhea, sinus pressure, sore throat, tinnitus and trouble swallowing.    Eyes: Positive for visual disturbance (wears glasses, denies vision changes). Negative for photophobia, pain, discharge, redness and itching.   Respiratory: Negative for apnea, cough, choking, chest tightness, shortness of breath and wheezing.    Cardiovascular: Negative for chest pain, palpitations and leg swelling.        No orthopnea, PND, OBANDO   Gastrointestinal: Negative for abdominal pain, blood in stool, constipation, diarrhea, nausea and vomiting.   Endocrine: Negative for cold intolerance, heat intolerance, polydipsia and polyuria.   Genitourinary: Negative for decreased urine volume, dysuria, enuresis, flank pain, frequency, hematuria and urgency.   Musculoskeletal: Negative for arthralgias, back pain, gait problem, joint swelling, myalgias, neck pain and neck stiffness.   Skin: Negative for color change and rash.        No hair changes, no nail changes   Allergic/Immunologic: Negative for environmental allergies, food allergies and immunocompromised state.   Neurological: Negative for dizziness, tremors, seizures, syncope, speech difficulty, weakness, light-headedness, numbness and headaches.   Hematological: Negative for adenopathy. Does not bruise/bleed easily.   Psychiatric/Behavioral: Negative for agitation, confusion, decreased concentration, dysphoric mood, sleep disturbance and suicidal ideas. The patient is not nervous/anxious.        Objective   Vitals:    12/23/21 0953   BP: 126/84   BP Location: Left arm   Patient Position: Sitting   Cuff Size: Adult   Pulse: 71   Resp: 16   Temp: 98.4 °F (36.9 °C)   TempSrc:  "Temporal   SpO2: 98%   Weight: 127 kg (279 lb)   Height: 177.8 cm (70\")     Body mass index is 40.03 kg/m².  Physical Exam  Constitutional:       General: He is not in acute distress.     Appearance: Normal appearance. He is not diaphoretic.   HENT:      Head: Normocephalic and atraumatic.      Right Ear: Tympanic membrane, ear canal and external ear normal.      Left Ear: Tympanic membrane, ear canal and external ear normal.      Nose: Nose normal. No rhinorrhea.      Mouth/Throat:      Mouth: Mucous membranes are moist.      Pharynx: Oropharynx is clear.   Eyes:      General:         Right eye: No discharge.         Left eye: No discharge.      Conjunctiva/sclera: Conjunctivae normal.   Cardiovascular:      Rate and Rhythm: Normal rate and regular rhythm.      Pulses: Normal pulses.           Dorsalis pedis pulses are 2+ on the right side and 2+ on the left side.        Posterior tibial pulses are 2+ on the right side and 2+ on the left side.      Heart sounds: Normal heart sounds.   Pulmonary:      Effort: Pulmonary effort is normal.      Breath sounds: Normal breath sounds.   Chest:   Breasts:      Right: Normal. No mass, nipple discharge, skin change or tenderness.      Left: Normal. No mass, nipple discharge, skin change or tenderness.       Abdominal:      General: Bowel sounds are normal.      Tenderness: There is no abdominal tenderness.   Musculoskeletal:         General: No swelling or tenderness.      Cervical back: Normal range of motion.      Right foot: Normal range of motion.      Left foot: Normal range of motion.   Feet:      Right foot:      Protective Sensation: 10 sites tested. 10 sites sensed.      Skin integrity: Skin integrity normal. No ulcer, blister or skin breakdown.      Toenail Condition: Right toenails are normal.      Left foot:      Protective Sensation: 10 sites tested. 10 sites sensed.      Skin integrity: Skin integrity normal. No ulcer, blister or skin breakdown.      Toenail " Condition: Left toenails are normal.      Comments: Diabetic Foot Exam Performed and Monofilament Test Performed    Skin:     General: Skin is warm and dry.   Neurological:      General: No focal deficit present.      Mental Status: He is alert and oriented to person, place, and time.   Psychiatric:         Mood and Affect: Mood normal.         Behavior: Behavior normal.         Judgment: Judgment normal.         Assessment/Plan   Diagnoses and all orders for this visit:    1. Physical exam (Primary)  -     CBC (No Diff)  -     Comprehensive Metabolic Panel  -     Lipid Panel  -     TSH    2. Uncontrolled type 2 diabetes mellitus with hyperglycemia (HCC)  Assessment & Plan:  He states home glucose readings have improved with current regiment, recheck A1c today. His A1c goal is <7, titrate medications as needed.    Orders:  -     Hemoglobin A1c  -     Microalbumin / Creatinine Urine Ratio - Urine, Clean Catch    3. Hypertension, essential  Assessment & Plan:  Hypertension is unchanged.  Continue current treatment regimen.  Dietary sodium restriction.  Blood pressure will be reassessed at the next regular appointment.  He will continue Losartan which he is tolerating well (managed for renal protection).      4. Hypercholesteremia  Assessment & Plan:  He denies myalgias with atorvastatin; recheck lipid panel along with a low-fat, low-cholesterol diet.      Risk assessment:  He has a family hx (mother and father) of DM2-we are working towards better diabetes control.  His Body mass index is 40.03 kg/m². - He would benefit from weight loss.    Prevention:  He has not received his annual flu vaccine. Tdap is not current.  He has received the COVID-19 vaccine, due for booster which he will receive at the pharmacy.    Discussed healthy lifestyle choices such as maintaining a balanced diet low in carbohydrates and limiting caffeine and alcohol intake.  Recommended routine exercise for bone strength and cardiovascular  health.

## 2021-12-26 NOTE — ASSESSMENT & PLAN NOTE
Hypertension is unchanged.  Continue current treatment regimen.  Dietary sodium restriction.  Blood pressure will be reassessed at the next regular appointment.  He will continue Losartan which he is tolerating well (managed for renal protection).

## 2021-12-26 NOTE — ASSESSMENT & PLAN NOTE
He states home glucose readings have improved with current regiment, recheck A1c today. His A1c goal is <7, titrate medications as needed.

## 2021-12-26 NOTE — ASSESSMENT & PLAN NOTE
He denies myalgias with atorvastatin; recheck lipid panel along with a low-fat, low-cholesterol diet.

## 2022-01-09 DIAGNOSIS — E78.5 HYPERLIPIDEMIA, UNSPECIFIED HYPERLIPIDEMIA TYPE: ICD-10-CM

## 2022-01-09 RX ORDER — ATORVASTATIN CALCIUM 10 MG/1
10 TABLET, FILM COATED ORAL DAILY
Qty: 90 TABLET | Refills: 1 | Status: SHIPPED | OUTPATIENT
Start: 2022-01-09 | End: 2022-04-10 | Stop reason: SDUPTHER

## 2022-01-23 DIAGNOSIS — I10 ESSENTIAL HYPERTENSION: ICD-10-CM

## 2022-01-23 DIAGNOSIS — E11.9 DIABETES MELLITUS, NEW ONSET: ICD-10-CM

## 2022-01-24 RX ORDER — LOSARTAN POTASSIUM 50 MG/1
50 TABLET ORAL DAILY
Qty: 90 TABLET | Refills: 0 | Status: SHIPPED | OUTPATIENT
Start: 2022-01-24 | End: 2022-05-01 | Stop reason: SDUPTHER

## 2022-01-24 RX ORDER — PIOGLITAZONEHYDROCHLORIDE 15 MG/1
15 TABLET ORAL DAILY
Qty: 90 TABLET | Refills: 3 | Status: SHIPPED | OUTPATIENT
Start: 2022-01-24 | End: 2022-04-12 | Stop reason: DRUGHIGH

## 2022-03-06 DIAGNOSIS — E11.65 UNCONTROLLED TYPE 2 DIABETES MELLITUS WITH HYPERGLYCEMIA: ICD-10-CM

## 2022-03-13 DIAGNOSIS — E11.9 DIABETES MELLITUS, NEW ONSET: ICD-10-CM

## 2022-03-14 RX ORDER — METFORMIN HYDROCHLORIDE 500 MG/1
500 TABLET, EXTENDED RELEASE ORAL 4 TIMES DAILY
Qty: 360 TABLET | Refills: 3 | Status: SHIPPED | OUTPATIENT
Start: 2022-03-14 | End: 2022-06-20 | Stop reason: SDUPTHER

## 2022-04-08 ENCOUNTER — OFFICE VISIT (OUTPATIENT)
Dept: INTERNAL MEDICINE | Facility: CLINIC | Age: 47
End: 2022-04-08

## 2022-04-08 VITALS
TEMPERATURE: 98 F | HEART RATE: 72 BPM | HEIGHT: 70 IN | BODY MASS INDEX: 40.69 KG/M2 | SYSTOLIC BLOOD PRESSURE: 128 MMHG | DIASTOLIC BLOOD PRESSURE: 64 MMHG | OXYGEN SATURATION: 95 % | WEIGHT: 284.2 LBS

## 2022-04-08 DIAGNOSIS — Z12.11 SCREENING FOR COLON CANCER: ICD-10-CM

## 2022-04-08 DIAGNOSIS — E11.65 UNCONTROLLED TYPE 2 DIABETES MELLITUS WITH HYPERGLYCEMIA: Primary | Chronic | ICD-10-CM

## 2022-04-08 DIAGNOSIS — E78.00 HYPERCHOLESTEREMIA: Chronic | ICD-10-CM

## 2022-04-08 DIAGNOSIS — R07.89 ATYPICAL CHEST PAIN: ICD-10-CM

## 2022-04-08 DIAGNOSIS — I10 HYPERTENSION, ESSENTIAL: Chronic | ICD-10-CM

## 2022-04-08 PROCEDURE — 99214 OFFICE O/P EST MOD 30 MIN: CPT | Performed by: NURSE PRACTITIONER

## 2022-04-08 NOTE — PROGRESS NOTES
"Chief Complaint  Diabetes, Hypertension, and Dyspnea     Subjective          Darren Pulido presents to [facility] for f/u regarding DM2, HTN and c/o increased dyspnea on exertion.    His recent A1c showed significant improvement at 6.9; tolerating medications well and without hypoglycemic episodes. He does note increased dyspnea with exertion with atypical chest pressure. His mother recently passed away due to CAD.    Objective   Vital Signs:   /64 (BP Location: Right arm, Patient Position: Sitting, Cuff Size: Adult)   Pulse 72   Temp 98 °F (36.7 °C) (Temporal)   Ht 177.8 cm (70\")   Wt 129 kg (284 lb 3.2 oz)   SpO2 95%   BMI 40.78 kg/m²     BMI is above normal parameters. Recommendations: nutrition counseling/recommendations       Physical Exam  Constitutional:       Appearance: He is well-developed. He is not ill-appearing.   HENT:      Head: Normocephalic.      Right Ear: Hearing, tympanic membrane and external ear normal.      Left Ear: Hearing, tympanic membrane and external ear normal.      Nose: Nose normal. No nasal deformity, mucosal edema or rhinorrhea.      Right Sinus: No maxillary sinus tenderness or frontal sinus tenderness.      Left Sinus: No maxillary sinus tenderness or frontal sinus tenderness.      Mouth/Throat:      Dentition: Normal dentition.   Eyes:      General: Lids are normal.         Right eye: No discharge.         Left eye: No discharge.      Conjunctiva/sclera: Conjunctivae normal.      Right eye: No exudate.     Left eye: No exudate.  Neck:      Thyroid: No thyroid mass or thyromegaly.      Vascular: No carotid bruit.      Trachea: Trachea normal.   Cardiovascular:      Rate and Rhythm: Regular rhythm.      Pulses: Normal pulses.      Heart sounds: Normal heart sounds. No murmur heard.  Pulmonary:      Effort: No respiratory distress.      Breath sounds: Normal breath sounds. No decreased breath sounds, wheezing, rhonchi or rales. "   Abdominal:      General: Bowel sounds are normal.      Palpations: Abdomen is soft.      Tenderness: There is no abdominal tenderness.   Musculoskeletal:      Cervical back: Normal range of motion. No edema.   Lymphadenopathy:      Head:      Right side of head: No submental, submandibular, tonsillar, preauricular, posterior auricular or occipital adenopathy.      Left side of head: No submental, submandibular, tonsillar, preauricular, posterior auricular or occipital adenopathy.   Skin:     General: Skin is warm and dry.      Nails: There is no clubbing.   Neurological:      Mental Status: He is alert.   Psychiatric:         Behavior: Behavior is cooperative.        Result Review :   The following data was reviewed by: WADE Black on 04/08/2022:  Common labs    Common Labsle 4/30/21 4/30/21 12/23/21 12/23/21 12/23/21 12/23/21 12/23/21 4/8/22 4/8/22    0938 0938 1111 1111 1111 1111 1111 1055 1055   Glucose  139 (A)  120 (A)     137 (A)   BUN  16  14     11   Creatinine  0.87  0.78     0.83   eGFR Non  Am  94  108        eGFR  Am  114  125        Sodium  141  139     139   Potassium  4.6  4.2     4.8   Chloride  102  101     102   Calcium  10.1  10.0     9.8   Total Protein  7.4  7.4     7.7   Albumin  4.80  4.6     4.7   Total Bilirubin  0.7  0.6     0.6   Alkaline Phosphatase  54  54     58   AST (SGOT)  18  20     17   ALT (SGPT)  31  24     21   WBC   10.2         Hemoglobin   17.4         Hematocrit   50.3         Platelets   306         Total Cholesterol     155       Triglycerides     103       HDL Cholesterol     48       LDL Cholesterol      88       Hemoglobin A1C 7.60 (A)     6.9 (A)  7.3 (A)    Microalbumin, Urine       <3.0     (A) Abnormal value       Comments are available for some flowsheets but are not being displayed.                     Assessment and Plan    Diagnoses and all orders for this visit:    1. Uncontrolled type 2 diabetes mellitus with hyperglycemia (HCC)  (Primary)  Assessment & Plan:  He is tolerating Jardiance, Metformin and Pioglitazone which he is tolerating well along with a low-carb, low-sugar diet.    Orders:  -     Hemoglobin A1c  -     Comprehensive Metabolic Panel    2. Hypertension, essential  Assessment & Plan:  BP is stable on Losartan which he will continue along with a low sodium diet.      3. Hypercholesteremia  Assessment & Plan:  He denies myalgias with atorvastatin which he will continue along with a low-fat, low-cholesterol diet.      4. Atypical chest pain  Assessment & Plan:  He c/o intermittent episodes of right anterior chest pressure with dyspnea on exertion, will obtain stress test to further evaluate due to risk factors (family hx, DM2, HTN, hypercholesteremia).    Orders:  -     Treadmill Stress Test; Future    5. Screening for colon cancer  -     Cologuard - Stool, Per Rectum      Follow Up   Return in about 3 months (around 7/8/2022).  Patient was given instructions and counseling regarding his condition or for health maintenance advice. Please see specific information pulled into the AVS if appropriate.

## 2022-04-09 PROBLEM — R07.89 ATYPICAL CHEST PAIN: Status: ACTIVE | Noted: 2022-04-09

## 2022-04-09 LAB
ALBUMIN SERPL-MCNC: 4.7 G/DL (ref 4–5)
ALBUMIN/GLOB SERPL: 1.6 {RATIO} (ref 1.2–2.2)
ALP SERPL-CCNC: 58 IU/L (ref 44–121)
ALT SERPL-CCNC: 21 IU/L (ref 0–44)
AST SERPL-CCNC: 17 IU/L (ref 0–40)
BILIRUB SERPL-MCNC: 0.6 MG/DL (ref 0–1.2)
BUN SERPL-MCNC: 11 MG/DL (ref 6–24)
BUN/CREAT SERPL: 13 (ref 9–20)
CALCIUM SERPL-MCNC: 9.8 MG/DL (ref 8.7–10.2)
CHLORIDE SERPL-SCNC: 102 MMOL/L (ref 96–106)
CO2 SERPL-SCNC: 21 MMOL/L (ref 20–29)
CREAT SERPL-MCNC: 0.83 MG/DL (ref 0.76–1.27)
EGFRCR SERPLBLD CKD-EPI 2021: 109 ML/MIN/1.73
GLOBULIN SER CALC-MCNC: 3 G/DL (ref 1.5–4.5)
GLUCOSE SERPL-MCNC: 137 MG/DL (ref 65–99)
HBA1C MFR BLD: 7.3 % (ref 4.8–5.6)
POTASSIUM SERPL-SCNC: 4.8 MMOL/L (ref 3.5–5.2)
PROT SERPL-MCNC: 7.7 G/DL (ref 6–8.5)
SODIUM SERPL-SCNC: 139 MMOL/L (ref 134–144)

## 2022-04-09 NOTE — ASSESSMENT & PLAN NOTE
He c/o intermittent episodes of right anterior chest pressure with dyspnea on exertion, will obtain stress test to further evaluate due to risk factors (family hx, DM2, HTN, hypercholesteremia).

## 2022-04-09 NOTE — ASSESSMENT & PLAN NOTE
He is tolerating Jardiance, Metformin and Pioglitazone which he is tolerating well along with a low-carb, low-sugar diet.

## 2022-04-09 NOTE — ASSESSMENT & PLAN NOTE
He denies myalgias with atorvastatin which he will continue along with a low-fat, low-cholesterol diet.

## 2022-04-10 DIAGNOSIS — E78.5 HYPERLIPIDEMIA, UNSPECIFIED HYPERLIPIDEMIA TYPE: ICD-10-CM

## 2022-04-11 RX ORDER — ATORVASTATIN CALCIUM 10 MG/1
10 TABLET, FILM COATED ORAL DAILY
Qty: 90 TABLET | Refills: 1 | Status: SHIPPED | OUTPATIENT
Start: 2022-04-11 | End: 2022-07-17 | Stop reason: SDUPTHER

## 2022-04-12 DIAGNOSIS — E11.9 DIABETES MELLITUS, NEW ONSET: ICD-10-CM

## 2022-04-12 RX ORDER — PIOGLITAZONEHYDROCHLORIDE 30 MG/1
30 TABLET ORAL DAILY
Qty: 90 TABLET | Refills: 1 | Status: SHIPPED | OUTPATIENT
Start: 2022-04-12 | End: 2022-07-17 | Stop reason: SDUPTHER

## 2022-05-01 DIAGNOSIS — I10 ESSENTIAL HYPERTENSION: ICD-10-CM

## 2022-05-02 RX ORDER — LOSARTAN POTASSIUM 50 MG/1
50 TABLET ORAL DAILY
Qty: 90 TABLET | Refills: 0 | Status: SHIPPED | OUTPATIENT
Start: 2022-05-02 | End: 2022-08-09 | Stop reason: SDUPTHER

## 2022-06-05 DIAGNOSIS — E11.65 UNCONTROLLED TYPE 2 DIABETES MELLITUS WITH HYPERGLYCEMIA: ICD-10-CM

## 2022-06-20 DIAGNOSIS — E11.9 DIABETES MELLITUS, NEW ONSET: ICD-10-CM

## 2022-06-20 RX ORDER — METFORMIN HYDROCHLORIDE 500 MG/1
500 TABLET, EXTENDED RELEASE ORAL 4 TIMES DAILY
Qty: 360 TABLET | Refills: 3 | Status: SHIPPED | OUTPATIENT
Start: 2022-06-20 | End: 2022-10-10 | Stop reason: SDUPTHER

## 2022-07-15 ENCOUNTER — OFFICE VISIT (OUTPATIENT)
Dept: INTERNAL MEDICINE | Facility: CLINIC | Age: 47
End: 2022-07-15

## 2022-07-15 VITALS
HEART RATE: 66 BPM | DIASTOLIC BLOOD PRESSURE: 88 MMHG | HEIGHT: 70 IN | OXYGEN SATURATION: 96 % | SYSTOLIC BLOOD PRESSURE: 130 MMHG | BODY MASS INDEX: 40.89 KG/M2 | TEMPERATURE: 98 F | WEIGHT: 285.6 LBS

## 2022-07-15 DIAGNOSIS — I10 HYPERTENSION, ESSENTIAL: Chronic | ICD-10-CM

## 2022-07-15 DIAGNOSIS — E11.9 TYPE 2 DIABETES MELLITUS WITHOUT COMPLICATION, WITHOUT LONG-TERM CURRENT USE OF INSULIN: Primary | Chronic | ICD-10-CM

## 2022-07-15 DIAGNOSIS — E78.00 HYPERCHOLESTEREMIA: Chronic | ICD-10-CM

## 2022-07-15 PROCEDURE — 99214 OFFICE O/P EST MOD 30 MIN: CPT | Performed by: NURSE PRACTITIONER

## 2022-07-15 NOTE — PROGRESS NOTES
"Chief Complaint  Hypertension (3 month follow up), Diabetes, and Hyperlipidemia    Subjective        Darren Pulido presents to [facility] for f/u regarding DM2, HTN and hypercholesteremia.    He continues to be under an increased amount of stress with the death of his mother earlier this year. He has tried to follow a low-carb, low-sugar diet and his diabetic control has improved significantly over the past year (last A1c 7.3%).      Objective   Vital Signs:  /88 (BP Location: Left arm, Patient Position: Sitting, Cuff Size: Large Adult)   Pulse 66   Temp 98 °F (36.7 °C) (Temporal)   Ht 177.8 cm (70\")   Wt 130 kg (285 lb 9.6 oz)   SpO2 96%   BMI 40.98 kg/m²   Estimated body mass index is 40.98 kg/m² as calculated from the following:    Height as of this encounter: 177.8 cm (70\").    Weight as of this encounter: 130 kg (285 lb 9.6 oz).    Class 3 Severe Obesity (BMI >=40). Obesity-related health conditions include the following: hypertension, diabetes mellitus and dyslipidemias. Obesity is unchanged. BMI is is above average; BMI management plan is completed. We discussed portion control and increasing exercise.      Physical Exam  Constitutional:       Appearance: He is well-developed. He is not ill-appearing.   HENT:      Head: Normocephalic.      Right Ear: Hearing, tympanic membrane and external ear normal.      Left Ear: Hearing, tympanic membrane and external ear normal.      Nose: Nose normal. No nasal deformity, mucosal edema or rhinorrhea.      Right Sinus: No maxillary sinus tenderness or frontal sinus tenderness.      Left Sinus: No maxillary sinus tenderness or frontal sinus tenderness.      Mouth/Throat:      Dentition: Normal dentition.   Eyes:      General: Lids are normal.         Right eye: No discharge.         Left eye: No discharge.      Conjunctiva/sclera: Conjunctivae normal.      Right eye: No exudate.     Left eye: No exudate.  Neck:      Thyroid: No " thyroid mass or thyromegaly.      Vascular: No carotid bruit.      Trachea: Trachea normal.   Cardiovascular:      Rate and Rhythm: Regular rhythm.      Pulses: Normal pulses.      Heart sounds: Normal heart sounds. No murmur heard.  Pulmonary:      Effort: No respiratory distress.      Breath sounds: Normal breath sounds. No decreased breath sounds, wheezing, rhonchi or rales.   Abdominal:      General: Bowel sounds are normal.      Palpations: Abdomen is soft.      Tenderness: There is no abdominal tenderness.   Musculoskeletal:      Cervical back: Normal range of motion. No edema.   Lymphadenopathy:      Head:      Right side of head: No submental, submandibular, tonsillar, preauricular, posterior auricular or occipital adenopathy.      Left side of head: No submental, submandibular, tonsillar, preauricular, posterior auricular or occipital adenopathy.   Skin:     General: Skin is warm and dry.      Nails: There is no clubbing.   Neurological:      Mental Status: He is alert.   Psychiatric:         Behavior: Behavior is cooperative.        Result Review :  The following data was reviewed by: WADE Black on 07/15/2022:  Common labs    Common Labsle 12/23/21 12/23/21 12/23/21 12/23/21 12/23/21 4/8/22 4/8/22 7/15/22 7/15/22 7/15/22    1111 1111 1111 1111 1111 1055 1055 1130 1130 1130   Glucose  120 (A)     137 (A)  100 (A)    BUN  14     11  10    Creatinine  0.78     0.83  0.82    eGFR Non African Am  108           eGFR  Am  125           Sodium  139     139  140    Potassium  4.2     4.8  4.6    Chloride  101     102  101    Calcium  10.0     9.8  10.1    Total Protein  7.4     7.7  7.3    Albumin  4.6     4.7  4.9    Total Bilirubin  0.6     0.6  0.8    Alkaline Phosphatase  54     58  50    AST (SGOT)  20     17  18    ALT (SGPT)  24     21  24    WBC 10.2            Hemoglobin 17.4            Hematocrit 50.3            Platelets 306            Total Cholesterol   155       142   Triglycerides    103       103   HDL Cholesterol   48       47   LDL Cholesterol    88       76   Hemoglobin A1C    6.9 (A)  7.3 (A)  6.8 (A)     Microalbumin, Urine     <3.0        (A) Abnormal value       Comments are available for some flowsheets but are not being displayed.                     Assessment and Plan   Diagnoses and all orders for this visit:    1. Type 2 diabetes mellitus without complication, without long-term current use of insulin (HCC) (Primary)  Assessment & Plan:  We discussed an A1c goal of <7 (previous labs 7.3); continue metformin, pioglitazone and Jardiance along with a low-carb, low sugar diet.  Recheck A1c today and titrate medication if needed.    Orders:  -     Hemoglobin A1c    2. Hypercholesteremia  Assessment & Plan:  He denies myalgias with atorvastatin which he will continue along with a low-fat, low-cholesterol diet.      Orders:  -     Comprehensive Metabolic Panel  -     Lipid Panel    3. Hypertension, essential  Assessment & Plan:  Hypertension is unchanged.  Continue current treatment regimen.  Dietary sodium restriction.  Blood pressure will be reassessed at the next regular appointment.  He will continue losartan which he is tolerating well.  BP is mildly elevated in the office which he will continue to monitor.    Orders:  -     TSH    We discussed Cologuard which is ordered for him in April, agrees to complete kit for colon cancer screening.       Follow Up   Return in about 3 months (around 10/15/2022).  Patient was given instructions and counseling regarding his condition or for health maintenance advice. Please see specific information pulled into the AVS if appropriate.

## 2022-07-16 LAB
ALBUMIN SERPL-MCNC: 4.9 G/DL (ref 4–5)
ALBUMIN/GLOB SERPL: 2 {RATIO} (ref 1.2–2.2)
ALP SERPL-CCNC: 50 IU/L (ref 44–121)
ALT SERPL-CCNC: 24 IU/L (ref 0–44)
AST SERPL-CCNC: 18 IU/L (ref 0–40)
BILIRUB SERPL-MCNC: 0.8 MG/DL (ref 0–1.2)
BUN SERPL-MCNC: 10 MG/DL (ref 6–24)
BUN/CREAT SERPL: 12 (ref 9–20)
CALCIUM SERPL-MCNC: 10.1 MG/DL (ref 8.7–10.2)
CHLORIDE SERPL-SCNC: 101 MMOL/L (ref 96–106)
CHOLEST SERPL-MCNC: 142 MG/DL (ref 100–199)
CO2 SERPL-SCNC: 25 MMOL/L (ref 20–29)
CREAT SERPL-MCNC: 0.82 MG/DL (ref 0.76–1.27)
EGFRCR SERPLBLD CKD-EPI 2021: 109 ML/MIN/1.73
GLOBULIN SER CALC-MCNC: 2.4 G/DL (ref 1.5–4.5)
GLUCOSE SERPL-MCNC: 100 MG/DL (ref 65–99)
HBA1C MFR BLD: 6.8 % (ref 4.8–5.6)
HDLC SERPL-MCNC: 47 MG/DL
LDLC SERPL CALC-MCNC: 76 MG/DL (ref 0–99)
POTASSIUM SERPL-SCNC: 4.6 MMOL/L (ref 3.5–5.2)
PROT SERPL-MCNC: 7.3 G/DL (ref 6–8.5)
SODIUM SERPL-SCNC: 140 MMOL/L (ref 134–144)
TRIGL SERPL-MCNC: 103 MG/DL (ref 0–149)
TSH SERPL DL<=0.005 MIU/L-ACNC: 2.09 UIU/ML (ref 0.45–4.5)
VLDLC SERPL CALC-MCNC: 19 MG/DL (ref 5–40)

## 2022-07-17 DIAGNOSIS — E78.5 HYPERLIPIDEMIA, UNSPECIFIED HYPERLIPIDEMIA TYPE: ICD-10-CM

## 2022-07-18 RX ORDER — ATORVASTATIN CALCIUM 10 MG/1
10 TABLET, FILM COATED ORAL DAILY
Qty: 90 TABLET | Refills: 1 | Status: SHIPPED | OUTPATIENT
Start: 2022-07-18 | End: 2022-10-30 | Stop reason: SDUPTHER

## 2022-07-18 RX ORDER — PIOGLITAZONEHYDROCHLORIDE 30 MG/1
30 TABLET ORAL DAILY
Qty: 90 TABLET | Refills: 1 | Status: SHIPPED | OUTPATIENT
Start: 2022-07-18 | End: 2022-10-30 | Stop reason: SDUPTHER

## 2022-07-24 NOTE — ASSESSMENT & PLAN NOTE
We discussed an A1c goal of <7 (previous labs 7.3); continue metformin, pioglitazone and Jardiance along with a low-carb, low sugar diet.  Recheck A1c today and titrate medication if needed.

## 2022-07-24 NOTE — ASSESSMENT & PLAN NOTE
Hypertension is unchanged.  Continue current treatment regimen.  Dietary sodium restriction.  Blood pressure will be reassessed at the next regular appointment.  He will continue losartan which he is tolerating well.  BP is mildly elevated in the office which he will continue to monitor.   difficulty urinating, dysuria, flank pain, frequency, hematuria and urgency. Musculoskeletal: Negative for myalgias, neck pain and neck stiffness. Skin: Negative for pallor, rash and wound. Neurological: Positive for weakness. Negative for dizziness, tingling, loss of consciousness, syncope, light-headedness, numbness and headaches. Physical Exam   Constitutional: She is oriented to person, place, and time. She appears well-developed and well-nourished. No distress. HENT:   Head: Normocephalic and atraumatic. Mouth/Throat: Oropharynx is clear and moist.   Eyes: Pupils are equal, round, and reactive to light. EOM are normal.   Neck: Normal range of motion. Neck supple. No tracheal deviation present. Cardiovascular: Normal rate, regular rhythm, normal heart sounds and intact distal pulses. No murmur heard. Pulses:       Radial pulses are 2+ on the right side, and 2+ on the left side. Dorsalis pedis pulses are 2+ on the right side, and 2+ on the left side. Posterior tibial pulses are 2+ on the right side, and 2+ on the left side. Pulmonary/Chest: Effort normal and breath sounds normal. No respiratory distress. She has no wheezes. Abdominal: Soft. Bowel sounds are normal. She exhibits no distension. There is no tenderness. There is no rebound and no guarding. Musculoskeletal: She exhibits deformity. She exhibits no edema. Left lower extremity is shortened and externally rotated. There is marked tenderness on movement of the left leg. Lymphadenopathy:     She has no cervical adenopathy. Neurological: She is alert and oriented to person, place, and time. No cranial nerve deficit. Skin: Skin is warm and dry. She is not diaphoretic. Nursing note and vitals reviewed.       Procedures    MDM  Number of Diagnoses or Management Options  Closed left hip fracture, initial encounter [facility]):   Fall, initial encounter:   Diagnosis management comments: Patient is an 80-year-old female

## 2022-08-09 DIAGNOSIS — I10 ESSENTIAL HYPERTENSION: ICD-10-CM

## 2022-08-10 RX ORDER — LOSARTAN POTASSIUM 50 MG/1
50 TABLET ORAL DAILY
Qty: 90 TABLET | Refills: 0 | Status: SHIPPED | OUTPATIENT
Start: 2022-08-10 | End: 2022-12-04 | Stop reason: SDUPTHER

## 2022-08-11 NOTE — TELEPHONE ENCOUNTER
[place] [id number] AT [facility] [address] - [phone]  - [phone] FX (Pharmacy)       metFORMIN ER (GLUCOPHAGE-XR) 500 MG 24 hr tablet  500 mg, 4 Times Daily     PHARMACY STATES PATIENT HAD PREVIOUSLY BEEN TAKING 1X PER DAY. QUANTITY DOESN'T MATCH THE SCRIPT .  
stated

## 2022-09-18 DIAGNOSIS — E11.65 UNCONTROLLED TYPE 2 DIABETES MELLITUS WITH HYPERGLYCEMIA: ICD-10-CM

## 2022-10-10 DIAGNOSIS — E11.9 DIABETES MELLITUS, NEW ONSET: ICD-10-CM

## 2022-10-11 RX ORDER — METFORMIN HYDROCHLORIDE 500 MG/1
500 TABLET, EXTENDED RELEASE ORAL 4 TIMES DAILY
Qty: 360 TABLET | Refills: 3 | Status: SHIPPED | OUTPATIENT
Start: 2022-10-11 | End: 2023-03-14 | Stop reason: SDUPTHER

## 2022-10-30 DIAGNOSIS — E78.5 HYPERLIPIDEMIA, UNSPECIFIED HYPERLIPIDEMIA TYPE: ICD-10-CM

## 2022-10-31 RX ORDER — PIOGLITAZONEHYDROCHLORIDE 30 MG/1
30 TABLET ORAL DAILY
Qty: 90 TABLET | Refills: 0 | Status: SHIPPED | OUTPATIENT
Start: 2022-10-31 | End: 2023-03-14 | Stop reason: SDUPTHER

## 2022-10-31 RX ORDER — ATORVASTATIN CALCIUM 10 MG/1
10 TABLET, FILM COATED ORAL DAILY
Qty: 90 TABLET | Refills: 0 | Status: SHIPPED | OUTPATIENT
Start: 2022-10-31 | End: 2023-03-14 | Stop reason: SDUPTHER

## 2022-12-04 DIAGNOSIS — I10 ESSENTIAL HYPERTENSION: ICD-10-CM

## 2022-12-05 RX ORDER — LOSARTAN POTASSIUM 50 MG/1
50 TABLET ORAL DAILY
Qty: 90 TABLET | Refills: 0 | Status: SHIPPED | OUTPATIENT
Start: 2022-12-05

## 2022-12-16 ENCOUNTER — OFFICE VISIT (OUTPATIENT)
Dept: INTERNAL MEDICINE | Facility: CLINIC | Age: 47
End: 2022-12-16

## 2022-12-16 VITALS
TEMPERATURE: 97.8 F | HEIGHT: 70 IN | OXYGEN SATURATION: 96 % | DIASTOLIC BLOOD PRESSURE: 84 MMHG | HEART RATE: 77 BPM | WEIGHT: 268.6 LBS | SYSTOLIC BLOOD PRESSURE: 132 MMHG | BODY MASS INDEX: 38.45 KG/M2

## 2022-12-16 DIAGNOSIS — E78.00 HYPERCHOLESTEREMIA: Chronic | ICD-10-CM

## 2022-12-16 DIAGNOSIS — Z00.00 PHYSICAL EXAM: Primary | ICD-10-CM

## 2022-12-16 DIAGNOSIS — E11.9 TYPE 2 DIABETES MELLITUS WITHOUT COMPLICATION, WITHOUT LONG-TERM CURRENT USE OF INSULIN: Chronic | ICD-10-CM

## 2022-12-16 DIAGNOSIS — I10 HYPERTENSION, ESSENTIAL: Chronic | ICD-10-CM

## 2022-12-16 LAB
ALBUMIN SERPL-MCNC: 4.7 G/DL (ref 3.5–5.2)
ALBUMIN/GLOB SERPL: 2 G/DL
ALP SERPL-CCNC: 51 U/L (ref 39–117)
ALT SERPL-CCNC: 16 U/L (ref 1–41)
AST SERPL-CCNC: 14 U/L (ref 1–40)
BILIRUB SERPL-MCNC: 0.6 MG/DL (ref 0–1.2)
BUN SERPL-MCNC: 19 MG/DL (ref 6–20)
BUN/CREAT SERPL: 26 (ref 7–25)
CALCIUM SERPL-MCNC: 10 MG/DL (ref 8.6–10.5)
CHLORIDE SERPL-SCNC: 100 MMOL/L (ref 98–107)
CO2 SERPL-SCNC: 28.5 MMOL/L (ref 22–29)
CREAT SERPL-MCNC: 0.73 MG/DL (ref 0.76–1.27)
EGFRCR SERPLBLD CKD-EPI 2021: 112.9 ML/MIN/1.73
GLOBULIN SER CALC-MCNC: 2.3 GM/DL
GLUCOSE SERPL-MCNC: 113 MG/DL (ref 65–99)
HBA1C MFR BLD: 6.6 % (ref 4.8–5.6)
POTASSIUM SERPL-SCNC: 4.6 MMOL/L (ref 3.5–5.2)
PROT SERPL-MCNC: 7 G/DL (ref 6–8.5)
SODIUM SERPL-SCNC: 139 MMOL/L (ref 136–145)

## 2022-12-16 PROCEDURE — 99396 PREV VISIT EST AGE 40-64: CPT | Performed by: NURSE PRACTITIONER

## 2022-12-16 NOTE — PROGRESS NOTES
"Chief Complaint  Diabetes (3 month follow up), Hypertension, Hyperlipidemia, and Annual Exam  Subjective        Darren Pulido presents to [facility] for an annual physical exam.    History of Present Illness    The patient is a 47-year-old male who present to the office to follow up in diabetes mellitus type 2, HTN and hyperlipidemia.    Diabetes  The patient last A1c in 07/2022 was 6.8. The patient is currently taking pioglitazone, Metformin and Jardiance. He is taking losartan for renal protection.    He reports that he experiences some great toe pain of bilateral feet which he describes as a needle being stuck in to the end of his toe.    Hyperlipidemia  The patient is taking atorvastatin to manage cholesterol levels.     The patient denies any headaches or dizziness. He notes that he has had some isolated incidents where he would go from laying to sitting up and became lightheaded.     The patient states that he is sleeping approximately 4 hours a night and he may take an hour nap during the day. He is grieving the death of his wife from August, will be moving in with his father soon. He admits to taking medication sporadically.    Healthcare maintenance  The patient reports that he has not done the ColoGuard test but he still has the kit. The patient states that he had the influenza vaccination last week. He is due for routine lab work.        Objective   Vital Signs:  /84 (BP Location: Right arm, Patient Position: Sitting, Cuff Size: Large Adult)   Pulse 77   Temp 97.8 °F (36.6 °C)   Ht 177.8 cm (70\")   Wt 122 kg (268 lb 9.6 oz)   SpO2 96%   BMI 38.54 kg/m²   Estimated body mass index is 38.54 kg/m² as calculated from the following:    Height as of this encounter: 177.8 cm (70\").    Weight as of this encounter: 122 kg (268 lb 9.6 oz).          Physical Exam  Constitutional:       Appearance: He is well-developed. He is not ill-appearing.   HENT:      Head: " Normocephalic.      Right Ear: Hearing, tympanic membrane and external ear normal.      Left Ear: Hearing, tympanic membrane and external ear normal.      Nose: Nose normal. No nasal deformity, mucosal edema or rhinorrhea.      Right Sinus: No maxillary sinus tenderness or frontal sinus tenderness.      Left Sinus: No maxillary sinus tenderness or frontal sinus tenderness.      Mouth/Throat:      Dentition: Normal dentition.   Eyes:      General: Lids are normal.         Right eye: No discharge.         Left eye: No discharge.      Conjunctiva/sclera: Conjunctivae normal.      Right eye: No exudate.     Left eye: No exudate.  Neck:      Thyroid: No thyroid mass or thyromegaly.      Vascular: No carotid bruit.      Trachea: Trachea normal.   Cardiovascular:      Rate and Rhythm: Regular rhythm.      Pulses: Normal pulses.           Dorsalis pedis pulses are 2+ on the right side and 2+ on the left side.        Posterior tibial pulses are 2+ on the right side and 2+ on the left side.      Heart sounds: Normal heart sounds. No murmur heard.  Pulmonary:      Effort: No respiratory distress.      Breath sounds: Normal breath sounds. No decreased breath sounds, wheezing, rhonchi or rales.   Abdominal:      General: Bowel sounds are normal.      Palpations: Abdomen is soft.      Tenderness: There is no abdominal tenderness.   Musculoskeletal:      Cervical back: Normal range of motion. No edema.      Right foot: Normal range of motion.      Left foot: Normal range of motion.   Feet:      Right foot:      Protective Sensation: 10 sites tested. 10 sites sensed.      Skin integrity: Skin integrity normal. No ulcer, blister or skin breakdown.      Toenail Condition: Right toenails are normal.      Left foot:      Protective Sensation: 10 sites tested. 10 sites sensed.      Skin integrity: Skin integrity normal. No ulcer, blister or skin breakdown.      Toenail Condition: Left toenails are normal.      Comments: Diabetic Foot  Exam Performed and Monofilament Test Performed    Lymphadenopathy:      Head:      Right side of head: No submental, submandibular, tonsillar, preauricular, posterior auricular or occipital adenopathy.      Left side of head: No submental, submandibular, tonsillar, preauricular, posterior auricular or occipital adenopathy.   Skin:     General: Skin is warm and dry.      Nails: There is no clubbing.   Neurological:      Mental Status: He is alert.   Psychiatric:         Behavior: Behavior is cooperative.        Result Review :                      Assessment and Plan   Diagnoses and all orders for this visit:    1. Physical exam (Primary)    2. Type 2 diabetes mellitus without complication, without long-term current use of insulin (HCC)  Assessment & Plan:  He admits to taking medication sporadically which he attributes to increased stress/grieving loss of his wife. He has lost thu 20# which he also attributes to stress. He will restart daily medications, check labs today.    Orders:  -     Hemoglobin A1c  -     Comprehensive Metabolic Panel    3. Hypercholesteremia  Comments:      Assessment & Plan:  He denies myalgias with atorvastatin which he will continue along with a low-fat, low-cholesterol diet.        4. Hypertension, essential  Assessment & Plan:  His BP is well-controlled in office today, continue losartan along with a low sodium diet.      Risk assessment:  He notes a family hx (mother) of CAD_managed on statin therapy. He also has a family hx (mother and father) of DM2.  His BMI si 38.54-he has lost thu 20# due to increased stress, continue to monitor.    Prevention:  He will complete ColoGuard testing, has test kit at home.      Follow Up   No follow-ups on file.  Patient was given instructions and counseling regarding his condition or for health maintenance advice. Please see specific information pulled into the AVS if appropriate.     Transcribed from ambient dictation for WADE Black by  Evelyn Yuan.  12/16/22   11:43 EST    Patient or patient representative verbalized consent to the visit recording.

## 2022-12-28 ENCOUNTER — TELEPHONE (OUTPATIENT)
Dept: INTERNAL MEDICINE | Facility: CLINIC | Age: 47
End: 2022-12-28

## 2023-01-22 DIAGNOSIS — E11.65 UNCONTROLLED TYPE 2 DIABETES MELLITUS WITH HYPERGLYCEMIA: ICD-10-CM

## 2023-01-23 NOTE — TELEPHONE ENCOUNTER
Rx Refill Note  Requested Prescriptions     Pending Prescriptions Disp Refills   • empagliflozin (Jardiance) 25 MG tablet tablet 90 tablet 0     Sig: Take 1 tablet by mouth Daily.      Last office visit with prescribing clinician: 12/16/2022   Last telemedicine visit with prescribing clinician: 3/10/2023   Next office visit with prescribing clinician: 3/10/2023                         Would you like a call back once the refill request has been completed: [] Yes [] No    If the office needs to give you a call back, can they leave a voicemail: [] Yes [] No    Marti Hopson  01/23/23, 08:21 EST

## 2023-03-14 DIAGNOSIS — E11.9 DIABETES MELLITUS, NEW ONSET: ICD-10-CM

## 2023-03-14 DIAGNOSIS — E78.5 HYPERLIPIDEMIA, UNSPECIFIED HYPERLIPIDEMIA TYPE: ICD-10-CM

## 2023-03-15 RX ORDER — ATORVASTATIN CALCIUM 10 MG/1
10 TABLET, FILM COATED ORAL DAILY
Qty: 90 TABLET | Refills: 0 | Status: SHIPPED | OUTPATIENT
Start: 2023-03-15

## 2023-03-15 RX ORDER — PIOGLITAZONEHYDROCHLORIDE 30 MG/1
30 TABLET ORAL DAILY
Qty: 90 TABLET | Refills: 0 | Status: SHIPPED | OUTPATIENT
Start: 2023-03-15

## 2023-03-15 RX ORDER — METFORMIN HYDROCHLORIDE 500 MG/1
500 TABLET, EXTENDED RELEASE ORAL 4 TIMES DAILY
Qty: 360 TABLET | Refills: 0 | Status: SHIPPED | OUTPATIENT
Start: 2023-03-15

## 2023-04-21 DIAGNOSIS — I10 ESSENTIAL HYPERTENSION: ICD-10-CM

## 2023-04-21 RX ORDER — LOSARTAN POTASSIUM 50 MG/1
50 TABLET ORAL DAILY
Qty: 90 TABLET | Refills: 0 | Status: SHIPPED | OUTPATIENT
Start: 2023-04-21

## 2023-04-21 NOTE — TELEPHONE ENCOUNTER
Caller: Darren Pulido    Relationship: Self    Best call back number:  [phone]  Requested Prescriptions:   Requested Prescriptions     Pending Prescriptions Disp Refills   • losartan (COZAAR) 50 MG tablet 90 tablet 0     Sig: Take 1 tablet by mouth Daily.        Pharmacy where request should be sent: [facility] [id number] [facility] [address] AT [facility]  & [facility] - [phone]  - [phone] FX     Last office visit with prescribing clinician: 12/16/2022   Last telemedicine visit with prescribing clinician: Visit date not found   Next office visit with prescribing clinician: Visit date not found     Additional details provided by patient: WILL NEED A  NEW PRESCRIPTION SENT TO PHARMACY IS OUT OF REFILLS   Does the patient have less than a 3 day supply:  [x] Yes  [] No    Would you like a call back once the refill request has been completed: [x] Yes [] No    If the office needs to give you a call back, can they leave a voicemail: [x] Yes [] No    Silas Valdez Rep   04/21/23 13:28 EDT

## 2023-06-02 ENCOUNTER — OFFICE VISIT (OUTPATIENT)
Dept: INTERNAL MEDICINE | Facility: CLINIC | Age: 48
End: 2023-06-02
Payer: COMMERCIAL

## 2023-06-02 VITALS
HEART RATE: 76 BPM | OXYGEN SATURATION: 98 % | WEIGHT: 263 LBS | DIASTOLIC BLOOD PRESSURE: 76 MMHG | SYSTOLIC BLOOD PRESSURE: 126 MMHG | BODY MASS INDEX: 37.65 KG/M2 | HEIGHT: 70 IN

## 2023-06-02 DIAGNOSIS — E11.9 TYPE 2 DIABETES MELLITUS WITHOUT COMPLICATION, WITHOUT LONG-TERM CURRENT USE OF INSULIN: Primary | Chronic | ICD-10-CM

## 2023-06-02 DIAGNOSIS — E78.00 HYPERCHOLESTEREMIA: Chronic | ICD-10-CM

## 2023-06-02 DIAGNOSIS — Z12.11 SCREENING FOR COLON CANCER: ICD-10-CM

## 2023-06-02 DIAGNOSIS — I10 HYPERTENSION, ESSENTIAL: Chronic | ICD-10-CM

## 2023-06-02 RX ORDER — LANCETS
1 EACH MISCELLANEOUS DAILY
Qty: 100 EACH | Refills: 1 | Status: SHIPPED | OUTPATIENT
Start: 2023-06-02

## 2023-06-02 RX ORDER — BLOOD-GLUCOSE METER
1 KIT MISCELLANEOUS DAILY
Qty: 1 EACH | Refills: 0 | Status: SHIPPED | OUTPATIENT
Start: 2023-06-02

## 2023-06-03 LAB
ALBUMIN SERPL-MCNC: 4.5 G/DL (ref 3.5–5.2)
ALBUMIN/CREAT UR: 4 MG/G CREAT (ref 0–29)
ALBUMIN/GLOB SERPL: 2 G/DL
ALP SERPL-CCNC: 46 U/L (ref 39–117)
ALT SERPL-CCNC: 27 U/L (ref 1–41)
AST SERPL-CCNC: 20 U/L (ref 1–40)
BILIRUB SERPL-MCNC: 0.7 MG/DL (ref 0–1.2)
BUN SERPL-MCNC: 11 MG/DL (ref 6–20)
BUN/CREAT SERPL: 15.5 (ref 7–25)
CALCIUM SERPL-MCNC: 9.5 MG/DL (ref 8.6–10.5)
CHLORIDE SERPL-SCNC: 106 MMOL/L (ref 98–107)
CHOLEST SERPL-MCNC: 164 MG/DL (ref 0–200)
CO2 SERPL-SCNC: 26.2 MMOL/L (ref 22–29)
CREAT SERPL-MCNC: 0.71 MG/DL (ref 0.76–1.27)
CREAT UR-MCNC: 138.8 MG/DL
EGFRCR SERPLBLD CKD-EPI 2021: 113.2 ML/MIN/1.73
ERYTHROCYTE [DISTWIDTH] IN BLOOD BY AUTOMATED COUNT: 13.5 % (ref 12.3–15.4)
GLOBULIN SER CALC-MCNC: 2.3 GM/DL
GLUCOSE SERPL-MCNC: 141 MG/DL (ref 65–99)
HBA1C MFR BLD: 6.3 % (ref 4.8–5.6)
HCT VFR BLD AUTO: 43.8 % (ref 37.5–51)
HDLC SERPL-MCNC: 54 MG/DL (ref 40–60)
HGB BLD-MCNC: 14.8 G/DL (ref 13–17.7)
LDLC SERPL CALC-MCNC: 94 MG/DL (ref 0–100)
MCH RBC QN AUTO: 30.3 PG (ref 26.6–33)
MCHC RBC AUTO-ENTMCNC: 33.8 G/DL (ref 31.5–35.7)
MCV RBC AUTO: 89.6 FL (ref 79–97)
MICROALBUMIN UR-MCNC: 5.5 UG/ML
PLATELET # BLD AUTO: 273 10*3/MM3 (ref 140–450)
POTASSIUM SERPL-SCNC: 4.9 MMOL/L (ref 3.5–5.2)
PROT SERPL-MCNC: 6.8 G/DL (ref 6–8.5)
RBC # BLD AUTO: 4.89 10*6/MM3 (ref 4.14–5.8)
SODIUM SERPL-SCNC: 142 MMOL/L (ref 136–145)
TRIGL SERPL-MCNC: 83 MG/DL (ref 0–150)
VLDLC SERPL CALC-MCNC: 16 MG/DL (ref 5–40)
WBC # BLD AUTO: 8.99 10*3/MM3 (ref 3.4–10.8)

## 2023-06-10 NOTE — PROGRESS NOTES
"Chief Complaint  Hypertension (F/u), Diabetes (Needs DM testing supplies), and Hyperlipidemia    Subjective        Darren Pulido presents to [facility] for f/u regarding DM2, hypercholesteremia, and HTN.    History of Present Illness  He has lost thu 25# over the past year, has lost thu 5# since his 12/2022 appointment which he attributes to change in lifestyle, grieving loss of his wife last year. He is now living with his father, notes change in diet.   He is no longer taking Jardiance due to change in formulary coverage in 2023, currently managed on pioglitazone and metformin daily but admits to often forgetting medication.    Objective   Vital Signs:  /76 (BP Location: Left arm, Patient Position: Sitting, Cuff Size: Adult)   Pulse 76   Ht 177.8 cm (70\")   Wt 119 kg (263 lb)   SpO2 98%   BMI 37.74 kg/m²   Estimated body mass index is 37.74 kg/m² as calculated from the following:    Height as of this encounter: 177.8 cm (70\").    Weight as of this encounter: 119 kg (263 lb).       Class 2 Severe Obesity (BMI >=35 and <=39.9). Obesity-related health conditions include the following: hypertension, diabetes mellitus, dyslipidemias, and osteoarthritis. Obesity is improving with lifestyle modifications. BMI is is above average; BMI management plan is completed. We discussed portion control and increasing exercise.      Physical Exam  Constitutional:       Appearance: He is well-developed. He is not ill-appearing.   HENT:      Head: Normocephalic.      Right Ear: Hearing, tympanic membrane and external ear normal.      Left Ear: Hearing, tympanic membrane and external ear normal.      Nose: Nose normal. No nasal deformity, mucosal edema or rhinorrhea.      Right Sinus: No maxillary sinus tenderness or frontal sinus tenderness.      Left Sinus: No maxillary sinus tenderness or frontal sinus tenderness.      Mouth/Throat:      Dentition: Normal dentition.   Eyes:      General: " Lids are normal.         Right eye: No discharge.         Left eye: No discharge.      Conjunctiva/sclera: Conjunctivae normal.      Right eye: No exudate.     Left eye: No exudate.  Neck:      Thyroid: No thyroid mass or thyromegaly.      Vascular: No carotid bruit.      Trachea: Trachea normal.   Cardiovascular:      Rate and Rhythm: Regular rhythm.      Pulses: Normal pulses.      Heart sounds: Normal heart sounds. No murmur heard.  Pulmonary:      Effort: No respiratory distress.      Breath sounds: Normal breath sounds. No decreased breath sounds, wheezing, rhonchi or rales.   Abdominal:      General: Bowel sounds are normal.      Palpations: Abdomen is soft.      Tenderness: There is no abdominal tenderness.   Musculoskeletal:      Cervical back: Normal range of motion. No edema.   Lymphadenopathy:      Head:      Right side of head: No submental, submandibular, tonsillar, preauricular, posterior auricular or occipital adenopathy.      Left side of head: No submental, submandibular, tonsillar, preauricular, posterior auricular or occipital adenopathy.   Skin:     General: Skin is warm and dry.      Nails: There is no clubbing.   Neurological:      Mental Status: He is alert.   Psychiatric:         Behavior: Behavior is cooperative.      Result Review :  The following data was reviewed by: WADE Black on 06/02/2023:  Common labs          7/15/2022    11:30 12/16/2022    09:52 6/2/2023    09:24   Common Labs   Glucose 100  113  141    BUN 10  19  11    Creatinine 0.82  0.73  0.71    Sodium 140  139  142    Potassium 4.6  4.6  4.9    Chloride 101  100  106    Calcium 10.1  10.0  9.5    Total Protein 7.3  7.0  6.8    Albumin 4.9  4.70  4.5    Total Bilirubin 0.8  0.6  0.7    Alkaline Phosphatase 50  51  46    AST (SGOT) 18  14  20    ALT (SGPT) 24  16  27    WBC   8.99    Hemoglobin   14.8    Hematocrit   43.8    Platelets   273    Total Cholesterol 142   164    Triglycerides 103   83    HDL Cholesterol  47   54    LDL Cholesterol  76   94    Hemoglobin A1C 6.8  6.60  6.30    Microalbumin, Urine   5.5                   Assessment and Plan   Diagnoses and all orders for this visit:    1. Type 2 diabetes mellitus without complication, without long-term current use of insulin (HCC) (Primary)  Assessment & Plan:  He has discontinued Jardiance due to cost/lack of formulary coverage; currently managed on metformin and pioglitazone daily. Recheck A1c today-continue a low-carb, low-sugar diet.      Orders:  -     Hemoglobin A1c  -     Comprehensive Metabolic Panel  -     Microalbumin / Creatinine Urine Ratio - Urine, Clean Catch  -     glucose blood test strip; 1 each by Other route 2 (Two) Times a Day. Use as instructed  Dispense: 100 each; Refill: 12  -     Accu-Chek FastClix Lancets misc; 1 Units Daily.  Dispense: 100 each; Refill: 1  -     glucose monitor monitoring kit; 1 each Daily. Accu-Chek FastClix  Dispense: 1 each; Refill: 0    2. Hypercholesteremia  Assessment & Plan:  He denies myalgias with atorvastatin which he will continue along with a low-fat, low-cholesterol diet.   Lab Results   Component Value Date    LDL 94 06/02/2023       Orders:  -     Lipid Panel  -     CBC (No Diff)    3. Hypertension, essential  Assessment & Plan:  BP is well-controlled on losartan along with a low sodium diet.      4. Screening for colon cancer  -     Cologuard - Stool, Per Rectum; Future    He did not return his ColoGuard testing in 2022 but agrees to complete, new order placed (prefers over colonoscopy).         Follow Up   Return in about 4 months (around 10/2/2023).  Patient was given instructions and counseling regarding his condition or for health maintenance advice. Please see specific information pulled into the AVS if appropriate.

## 2023-06-11 NOTE — ASSESSMENT & PLAN NOTE
He has discontinued Jardiance due to cost/lack of formulary coverage; currently managed on metformin and pioglitazone daily. Recheck A1c today-continue a low-carb, low-sugar diet.

## 2023-06-11 NOTE — ASSESSMENT & PLAN NOTE
He denies myalgias with atorvastatin which he will continue along with a low-fat, low-cholesterol diet.   Lab Results   Component Value Date    LDL 94 06/02/2023

## 2023-08-13 DIAGNOSIS — E11.9 DIABETES MELLITUS, NEW ONSET: ICD-10-CM

## 2023-08-13 DIAGNOSIS — I10 ESSENTIAL HYPERTENSION: ICD-10-CM

## 2023-08-13 DIAGNOSIS — E78.5 HYPERLIPIDEMIA, UNSPECIFIED HYPERLIPIDEMIA TYPE: ICD-10-CM

## 2023-08-14 RX ORDER — ATORVASTATIN CALCIUM 10 MG/1
10 TABLET, FILM COATED ORAL DAILY
Qty: 90 TABLET | Refills: 0 | Status: SHIPPED | OUTPATIENT
Start: 2023-08-14

## 2023-08-14 RX ORDER — METFORMIN HYDROCHLORIDE 500 MG/1
500 TABLET, EXTENDED RELEASE ORAL 4 TIMES DAILY
Qty: 360 TABLET | Refills: 0 | Status: SHIPPED | OUTPATIENT
Start: 2023-08-14

## 2023-08-14 RX ORDER — PIOGLITAZONEHYDROCHLORIDE 30 MG/1
30 TABLET ORAL DAILY
Qty: 90 TABLET | Refills: 0 | Status: SHIPPED | OUTPATIENT
Start: 2023-08-14

## 2023-08-14 RX ORDER — LOSARTAN POTASSIUM 50 MG/1
50 TABLET ORAL DAILY
Qty: 90 TABLET | Refills: 0 | Status: SHIPPED | OUTPATIENT
Start: 2023-08-14

## 2023-10-05 ENCOUNTER — TELEPHONE (OUTPATIENT)
Dept: INTERNAL MEDICINE | Facility: CLINIC | Age: 48
End: 2023-10-05
Payer: COMMERCIAL

## 2023-10-05 NOTE — TELEPHONE ENCOUNTER
Okay for hub to ask/relay     Are you still going to complete your cologuard? If you need the kit please call [employer] at [phone] for information

## 2023-12-08 ENCOUNTER — TELEPHONE (OUTPATIENT)
Dept: INTERNAL MEDICINE | Facility: CLINIC | Age: 48
End: 2023-12-08
Payer: COMMERCIAL

## 2023-12-08 NOTE — TELEPHONE ENCOUNTER
Called patient regarding incomplete cologuard. Patient states he has to call to get another kit     Patient also stated he did not get any reminders for the appointment he missed and will callback to reschedule when he knows his schedule/has availability

## 2024-03-07 ENCOUNTER — PATIENT MESSAGE (OUTPATIENT)
Dept: INTERNAL MEDICINE | Facility: CLINIC | Age: 49
End: 2024-03-07
Payer: COMMERCIAL

## 2024-03-09 DIAGNOSIS — I10 ESSENTIAL HYPERTENSION: ICD-10-CM

## 2024-03-09 DIAGNOSIS — E78.5 HYPERLIPIDEMIA, UNSPECIFIED HYPERLIPIDEMIA TYPE: ICD-10-CM

## 2024-03-09 DIAGNOSIS — E11.9 DIABETES MELLITUS, NEW ONSET: ICD-10-CM

## 2024-03-12 NOTE — TELEPHONE ENCOUNTER
From: Salma HOLMAN  To: Darren Pulido  Sent: 3/7/2024 7:47 AM EST  Subject: Overdue Cologuard    Hello,    I am reaching out from Marsha Daley's office regarding an overdue order in your chart for a ColoGuard. Please complete this test at your earliest convenience with the testing kit that was mailed to your home.    If you have not received the kit, please let me know.    It looks like you are also overdue for your Annual Physical, if you'd like to let me know days and times that usually work for you I can get you scheduled.    Have a great day,  CHEO Marsh

## 2024-03-13 RX ORDER — METFORMIN HYDROCHLORIDE 500 MG/1
500 TABLET, EXTENDED RELEASE ORAL
Qty: 360 TABLET | Refills: 0 | Status: SHIPPED | OUTPATIENT
Start: 2024-03-13

## 2024-03-13 RX ORDER — PIOGLITAZONEHYDROCHLORIDE 30 MG/1
30 TABLET ORAL DAILY
Qty: 90 TABLET | Refills: 0 | Status: SHIPPED | OUTPATIENT
Start: 2024-03-13

## 2024-03-13 RX ORDER — ATORVASTATIN CALCIUM 10 MG/1
10 TABLET, FILM COATED ORAL DAILY
Qty: 90 TABLET | Refills: 0 | Status: SHIPPED | OUTPATIENT
Start: 2024-03-13

## 2024-03-13 RX ORDER — LOSARTAN POTASSIUM 50 MG/1
50 TABLET ORAL DAILY
Qty: 90 TABLET | Refills: 0 | Status: SHIPPED | OUTPATIENT
Start: 2024-03-13

## 2024-03-19 ENCOUNTER — OFFICE VISIT (OUTPATIENT)
Dept: INTERNAL MEDICINE | Facility: CLINIC | Age: 49
End: 2024-03-19
Payer: COMMERCIAL

## 2024-03-19 VITALS
DIASTOLIC BLOOD PRESSURE: 86 MMHG | BODY MASS INDEX: 40.31 KG/M2 | SYSTOLIC BLOOD PRESSURE: 130 MMHG | HEART RATE: 73 BPM | WEIGHT: 281.6 LBS | HEIGHT: 70 IN | OXYGEN SATURATION: 96 %

## 2024-03-19 DIAGNOSIS — L30.9 DERMATITIS: ICD-10-CM

## 2024-03-19 DIAGNOSIS — Z12.11 SCREENING FOR COLON CANCER: ICD-10-CM

## 2024-03-19 DIAGNOSIS — Z00.00 PE (PHYSICAL EXAM), ANNUAL: Primary | ICD-10-CM

## 2024-03-19 DIAGNOSIS — E78.00 HYPERCHOLESTEREMIA: Chronic | ICD-10-CM

## 2024-03-19 DIAGNOSIS — I10 HYPERTENSION, ESSENTIAL: Chronic | ICD-10-CM

## 2024-03-19 DIAGNOSIS — E11.9 TYPE 2 DIABETES MELLITUS WITHOUT COMPLICATION, WITHOUT LONG-TERM CURRENT USE OF INSULIN: Chronic | ICD-10-CM

## 2024-03-19 LAB
ALBUMIN SERPL-MCNC: 4.5 G/DL (ref 3.5–5.2)
ALBUMIN/GLOB SERPL: 1.7 G/DL
ALP SERPL-CCNC: 68 U/L (ref 39–117)
ALT SERPL-CCNC: 25 U/L (ref 1–41)
AST SERPL-CCNC: 15 U/L (ref 1–40)
BILIRUB SERPL-MCNC: 0.5 MG/DL (ref 0–1.2)
BUN SERPL-MCNC: 10 MG/DL (ref 6–20)
BUN/CREAT SERPL: 11 (ref 7–25)
CALCIUM SERPL-MCNC: 9.8 MG/DL (ref 8.6–10.5)
CHLORIDE SERPL-SCNC: 101 MMOL/L (ref 98–107)
CHOLEST SERPL-MCNC: 163 MG/DL (ref 0–200)
CO2 SERPL-SCNC: 25.3 MMOL/L (ref 22–29)
CREAT SERPL-MCNC: 0.91 MG/DL (ref 0.76–1.27)
EGFRCR SERPLBLD CKD-EPI 2021: 103.3 ML/MIN/1.73
ERYTHROCYTE [DISTWIDTH] IN BLOOD BY AUTOMATED COUNT: 12.8 % (ref 12.3–15.4)
GLOBULIN SER CALC-MCNC: 2.7 GM/DL
GLUCOSE SERPL-MCNC: 163 MG/DL (ref 65–99)
HCT VFR BLD AUTO: 49.6 % (ref 37.5–51)
HDLC SERPL-MCNC: 53 MG/DL (ref 40–60)
HGB BLD-MCNC: 16.6 G/DL (ref 13–17.7)
LDLC SERPL CALC-MCNC: 92 MG/DL (ref 0–100)
MCH RBC QN AUTO: 29.8 PG (ref 26.6–33)
MCHC RBC AUTO-ENTMCNC: 33.5 G/DL (ref 31.5–35.7)
MCV RBC AUTO: 89 FL (ref 79–97)
PLATELET # BLD AUTO: 309 10*3/MM3 (ref 140–450)
POTASSIUM SERPL-SCNC: 4.5 MMOL/L (ref 3.5–5.2)
PROT SERPL-MCNC: 7.2 G/DL (ref 6–8.5)
RBC # BLD AUTO: 5.57 10*6/MM3 (ref 4.14–5.8)
SODIUM SERPL-SCNC: 137 MMOL/L (ref 136–145)
TRIGL SERPL-MCNC: 101 MG/DL (ref 0–150)
TSH SERPL DL<=0.005 MIU/L-ACNC: 2.74 UIU/ML (ref 0.27–4.2)
VLDLC SERPL CALC-MCNC: 18 MG/DL (ref 5–40)
WBC # BLD AUTO: 10.14 10*3/MM3 (ref 3.4–10.8)

## 2024-03-19 RX ORDER — TRIAMCINOLONE ACETONIDE 5 MG/G
1 CREAM TOPICAL 3 TIMES DAILY
Qty: 30 G | Refills: 0 | Status: SHIPPED | OUTPATIENT
Start: 2024-03-19

## 2024-03-19 RX ORDER — METFORMIN HYDROCHLORIDE 500 MG/1
2000 TABLET, EXTENDED RELEASE ORAL
Qty: 360 TABLET | Refills: 1 | Status: SHIPPED | OUTPATIENT
Start: 2024-03-19

## 2024-03-20 DIAGNOSIS — E11.9 TYPE 2 DIABETES MELLITUS WITHOUT COMPLICATION, WITHOUT LONG-TERM CURRENT USE OF INSULIN: Primary | ICD-10-CM

## 2024-03-20 LAB
HBA1C MFR BLD: 7.4 % (ref 4.8–5.6)
WRITTEN AUTHORIZATION: NORMAL

## 2024-03-20 NOTE — ASSESSMENT & PLAN NOTE
His A1c was 6.3 in 06/2023. I will send in a prescription for metformin. He was advised to take his metformin every day, advised he can take all tablets together.

## 2024-03-20 NOTE — ASSESSMENT & PLAN NOTE
He denies myalgias with atorvastatin which he will continue, recheck lipid panel. LDL goal is <100.

## 2024-03-20 NOTE — PROGRESS NOTES
Subjective   Darren Pulido is a 49 y.o. male who is here for a physical exam.    History of Present Illness   History of Present Illness    He thinks his sugar is doing pretty well. His prescription for Metformin was inadvertently changed to 1 tablet with his last prescription which he has taken. However, he admits to often forgetting his medicine. 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 He also works full-time and has remained busy in selling his house. He checked his fasting blood sugar 3 days ago and it was 150. Today, it was 166 and the day before it was 170. He has noticed an elevated glucose since lowering his metformin to 1 daily.     He c/o a rash on his bilateral forearms which has been recurrent. He notes mild irritation of the lesions but states he worsens them due to recurrent picking and scratching of lesions.    He notes recent GI symptoms with nausea and loose stools (father and similar sx). He also experienced vomiting. Denies abdominal pain.    Past Medical History:   Diagnosis Date    Allergic rhinitis     Anxiety     Anxiety/ Stress    Bundle branch block     Right Bundle Branch Block on EKG    Fatigue     Fatigue/Lethargy    Gastroesophageal reflux     Hiatal hernia     History of EKG 06/21/2010    Insomnia     Low back pain     with sciatic component to right leg    Low back strain     Morbid obesity     Palpitation     Rotator cuff tear, right     Syncopal episodes     x 3 due to overheating at [employer]    Vasovagal episode     Vasovagal episodes    Weight loss     Intentional weight loss with Nutrasystem         Current Outpatient Medications:     Accu-Chek FastClix Lancets misc, 1 Units Daily., Disp: 100 each, Rfl: 1    atorvastatin (LIPITOR) 10 MG tablet, Take 1 tablet by mouth Daily., Disp: 90 tablet, Rfl: 0    Blood Gluc Meter Disp-Strips device, 1 Units Daily., Disp: 1 each, Rfl: 0    glucose blood test strip, 1 each by Other route 2 (Two) Times a  Day. Use as instructed, Disp: 100 each, Rfl: 12    glucose monitor monitoring kit, 1 each Daily. Accu-Chek FastClix, Disp: 1 each, Rfl: 0    Lancets 30G misc, 1 Units 2 (Two) Times a Day., Disp: 100 each, Rfl: 1    losartan (COZAAR) 50 MG tablet, Take 1 tablet by mouth Daily., Disp: 90 tablet, Rfl: 0    metFORMIN ER (GLUCOPHAGE-XR) 500 MG 24 hr tablet, Take 4 tablets by mouth Daily With Breakfast., Disp: 360 tablet, Rfl: 1    pioglitazone (Actos) 30 MG tablet, Take 1 tablet by mouth Daily., Disp: 90 tablet, Rfl: 0    triamcinolone (KENALOG) 0.5 % cream, Apply 1 Application topically to the appropriate area as directed 3 (Three) Times a Day., Disp: 30 g, Rfl: 0    Allergies   Allergen Reactions    Aspirin        Review of Systems   Constitutional:  Negative for activity change, appetite change, chills, diaphoresis, fatigue, fever and unexpected weight change.   HENT:  Negative for congestion, dental problem, drooling, ear discharge, ear pain, facial swelling, hearing loss, mouth sores, nosebleeds, postnasal drip, rhinorrhea, sinus pressure, sore throat, tinnitus and trouble swallowing.    Eyes:  Negative for photophobia, pain, discharge, redness, itching and visual disturbance.   Respiratory:  Negative for apnea, cough, choking, chest tightness, shortness of breath and wheezing.    Cardiovascular:  Negative for chest pain, palpitations and leg swelling.        No orthopnea, PND, OBANDO   Gastrointestinal:  Negative for abdominal pain, blood in stool, constipation, diarrhea, nausea and vomiting.   Endocrine: Negative for cold intolerance, heat intolerance, polydipsia and polyuria.   Genitourinary:  Negative for decreased urine volume, dysuria, enuresis, flank pain, frequency, hematuria and urgency.   Musculoskeletal:  Negative for arthralgias, back pain, gait problem, joint swelling, myalgias, neck pain and neck stiffness.   Skin:  Negative for color change and rash.        No hair changes, no nail changes  "  Allergic/Immunologic: Negative for environmental allergies, food allergies and immunocompromised state.   Neurological:  Negative for dizziness, tremors, seizures, syncope, speech difficulty, weakness, light-headedness, numbness and headaches.   Hematological:  Negative for adenopathy. Does not bruise/bleed easily.   Psychiatric/Behavioral:  Negative for agitation, confusion, decreased concentration, dysphoric mood, sleep disturbance and suicidal ideas. The patient is not nervous/anxious.        Objective   Vitals:    03/19/24 1512   BP: 130/86   BP Location: Left arm   Patient Position: Sitting   Cuff Size: Large Adult   Pulse: 73   SpO2: 96%   Weight: 128 kg (281 lb 9.6 oz)   Height: 177.8 cm (70\")     Physical Exam  Constitutional:       General: He is not in acute distress.     Appearance: Normal appearance. He is not diaphoretic.   HENT:      Head: Normocephalic and atraumatic.      Right Ear: Tympanic membrane, ear canal and external ear normal.      Left Ear: Tympanic membrane, ear canal and external ear normal.      Nose: Nose normal. No rhinorrhea.      Mouth/Throat:      Mouth: Mucous membranes are moist.      Pharynx: Oropharynx is clear.   Eyes:      General:         Right eye: No discharge.         Left eye: No discharge.      Conjunctiva/sclera: Conjunctivae normal.   Cardiovascular:      Rate and Rhythm: Normal rate and regular rhythm.      Pulses: Normal pulses.           Dorsalis pedis pulses are 2+ on the right side and 2+ on the left side.        Posterior tibial pulses are 2+ on the right side and 2+ on the left side.      Heart sounds: Normal heart sounds.   Pulmonary:      Effort: Pulmonary effort is normal.      Breath sounds: Normal breath sounds.   Abdominal:      General: Bowel sounds are normal.      Tenderness: There is no abdominal tenderness.   Musculoskeletal:         General: No swelling or tenderness.      Cervical back: Normal range of motion.      Right foot: Normal range of " motion.      Left foot: Normal range of motion.   Feet:      Right foot:      Protective Sensation: 10 sites tested.  10 sites sensed.      Skin integrity: Skin integrity normal. No ulcer, blister or skin breakdown.      Toenail Condition: Right toenails are normal.      Left foot:      Protective Sensation: 10 sites tested.  10 sites sensed.      Skin integrity: Skin integrity normal. No ulcer, blister or skin breakdown.      Toenail Condition: Left toenails are normal.      Comments: Diabetic Foot Exam Performed and Monofilament Test Performed    Skin:     General: Skin is warm and dry.      Comments: Erythematous macular lesion on bilateral forearms with excoriation, no active drainage or bleeding   Neurological:      General: No focal deficit present.      Mental Status: He is alert and oriented to person, place, and time.   Psychiatric:         Mood and Affect: Mood normal.         Behavior: Behavior normal.         Judgment: Judgment normal.       Physical Exam      Results  Laboratory Studies  Labs were reviewed with the patient.       Assessment & Plan   Diagnoses and all orders for this visit:    1. PE (physical exam), annual (Primary)  -     CBC (No Diff)  -     Comprehensive Metabolic Panel  -     Lipid Panel  -     TSH    2. Type 2 diabetes mellitus without complication, without long-term current use of insulin  Assessment & Plan:  His A1c was 6.3 in 06/2023. I will send in a prescription for metformin. He was advised to take his metformin every day, advised he can take all tablets together.    Orders:  -     metFORMIN ER (GLUCOPHAGE-XR) 500 MG 24 hr tablet; Take 4 tablets by mouth Daily With Breakfast.  Dispense: 360 tablet; Refill: 1    3. Hypertension, essential  Assessment & Plan:  BP is stable, continue losartan along with a low sodium diet.      4. Hypercholesteremia  Assessment & Plan:  He denies myalgias with atorvastatin which he will continue, recheck lipid panel. LDL goal is <100.      5.  Dermatitis  Comments:  Discussed use of cream and avoiding scratching/chronic irritation. RTC if sx persist tor worsen.  Orders:  -     triamcinolone (KENALOG) 0.5 % cream; Apply 1 Application topically to the appropriate area as directed 3 (Three) Times a Day.  Dispense: 30 g; Refill: 0    6. Screening for colon cancer  -     Cologuard - Stool, Per Rectum; Future    He is due for a diabetic eye exam which he is encouraged to schedule. He did not receive the ColoGuard box ordered last year, new order placed today. He is advised to notify office if he does not receive the collection box.  Assessment & Plan      Risk Assessment:  Family History   Problem Relation Age of Onset    Hyperlipidemia Mother     Hypertension Mother     Diabetes type II Mother     Coronary artery disease Mother         Stent placement x3    Obesity Father     Hypertension Father     Diabetes type II Father     Coronary artery disease Maternal Uncle 52    Seizures Paternal Uncle     Other Paternal Uncle         Epilepsy    Heart disease Maternal Grandmother     Heart disease Maternal Grandfather     Ulcers Maternal Grandfather     Alzheimer's disease Paternal Grandmother     Bone cancer Paternal Grandfather     Depression Other      His Body mass index is 40.41 kg/m². His weight has increased from his last visit, encouraged to work on lifestyle modifications to help with weight loss.    Prevention:  Health Maintenance   Topic Date Due    COLORECTAL CANCER SCREENING  Never done    Pneumococcal Vaccine 0-64 (1 of 2 - PCV) Never done    Hepatitis B (1 of 3 - 19+ 3-dose series) Never done    TDAP/TD VACCINES (2 - Td or Tdap) 01/01/2013    DIABETIC EYE EXAM  11/17/2022    COVID-19 Vaccine (6 - 2023-24 season) 09/01/2023    HEMOGLOBIN A1C  12/02/2023    ANNUAL PHYSICAL  12/16/2023    LIPID PANEL  06/02/2024    URINE MICROALBUMIN  06/02/2024    BMI FOLLOWUP  06/02/2024    DIABETIC FOOT EXAM  03/19/2025    HEPATITIS C SCREENING  Completed    INFLUENZA  VACCINE  Completed       Discussed healthy lifestyle choices such as maintaining a balanced diet low in carbohydrates and limiting caffeine and alcohol intake.  Recommended routine exercise for bone strength and cardiovascular health.         Patient or patient representative verbalized consent for the use of Ambient Listening during the visit with  WADE Black for chart documentation. 3/19/2024  20:26 EDT

## 2024-04-07 DIAGNOSIS — I10 ESSENTIAL HYPERTENSION: ICD-10-CM

## 2024-04-07 DIAGNOSIS — E78.5 HYPERLIPIDEMIA, UNSPECIFIED HYPERLIPIDEMIA TYPE: ICD-10-CM

## 2024-04-08 RX ORDER — ATORVASTATIN CALCIUM 10 MG/1
10 TABLET, FILM COATED ORAL DAILY
Qty: 90 TABLET | Refills: 0 | Status: SHIPPED | OUTPATIENT
Start: 2024-04-08

## 2024-04-08 RX ORDER — PIOGLITAZONEHYDROCHLORIDE 30 MG/1
30 TABLET ORAL DAILY
Qty: 90 TABLET | Refills: 0 | Status: SHIPPED | OUTPATIENT
Start: 2024-04-08

## 2024-04-08 RX ORDER — LOSARTAN POTASSIUM 50 MG/1
50 TABLET ORAL DAILY
Qty: 90 TABLET | Refills: 0 | Status: SHIPPED | OUTPATIENT
Start: 2024-04-08

## 2024-04-16 DIAGNOSIS — I10 ESSENTIAL HYPERTENSION: ICD-10-CM

## 2024-04-16 DIAGNOSIS — E78.5 HYPERLIPIDEMIA, UNSPECIFIED HYPERLIPIDEMIA TYPE: ICD-10-CM

## 2024-04-16 RX ORDER — LOSARTAN POTASSIUM 50 MG/1
50 TABLET ORAL DAILY
Qty: 90 TABLET | Refills: 0 | Status: SHIPPED | OUTPATIENT
Start: 2024-04-16

## 2024-04-16 RX ORDER — PIOGLITAZONEHYDROCHLORIDE 30 MG/1
30 TABLET ORAL DAILY
Qty: 90 TABLET | Refills: 0 | Status: SHIPPED | OUTPATIENT
Start: 2024-04-16

## 2024-04-16 RX ORDER — ATORVASTATIN CALCIUM 10 MG/1
10 TABLET, FILM COATED ORAL DAILY
Qty: 90 TABLET | Refills: 0 | Status: SHIPPED | OUTPATIENT
Start: 2024-04-16

## 2024-04-16 NOTE — TELEPHONE ENCOUNTER
Best call back number: [phone]    Requested Prescriptions:   Requested Prescriptions     Pending Prescriptions Disp Refills    losartan (COZAAR) 50 MG tablet 90 tablet 0     Sig: Take 1 tablet by mouth Daily.    pioglitazone (Actos) 30 MG tablet 90 tablet 0     Sig: Take 1 tablet by mouth Daily.    atorvastatin (LIPITOR) 10 MG tablet 90 tablet 0     Sig: Take 1 tablet by mouth Daily.        Pharmacy where request should be sent: [id number] - [place], [place] - [address] AT [facility] [phone] [facility] [phone]      Last office visit with prescribing clinician: 3/19/2024   Last telemedicine visit with prescribing clinician: Visit date not found   Next office visit with prescribing clinician: 6/21/2024     Additional details provided by patient: PATIENT WOULD LIKE TO START USING [facility] INSTEAD OF [facility].     Does the patient have less than a 3 day supply:  [] Yes  [x] No    Would you like a call back once the refill request has been completed: [] Yes [x] No    If the office needs to give you a call back, can they leave a voicemail: [] Yes [x] No    Silas Dawson Rep   04/16/24 12:57 EDT

## 2024-06-18 DIAGNOSIS — I10 ESSENTIAL HYPERTENSION: ICD-10-CM

## 2024-06-18 DIAGNOSIS — E78.5 HYPERLIPIDEMIA, UNSPECIFIED HYPERLIPIDEMIA TYPE: ICD-10-CM

## 2024-06-19 RX ORDER — LOSARTAN POTASSIUM 50 MG/1
50 TABLET ORAL DAILY
Qty: 90 TABLET | Refills: 0 | Status: SHIPPED | OUTPATIENT
Start: 2024-06-19 | End: 2024-06-21 | Stop reason: SDUPTHER

## 2024-06-19 RX ORDER — ATORVASTATIN CALCIUM 10 MG/1
10 TABLET, FILM COATED ORAL DAILY
Qty: 90 TABLET | Refills: 0 | Status: SHIPPED | OUTPATIENT
Start: 2024-06-19 | End: 2024-06-21 | Stop reason: SDUPTHER

## 2024-06-19 RX ORDER — PIOGLITAZONEHYDROCHLORIDE 30 MG/1
30 TABLET ORAL DAILY
Qty: 90 TABLET | Refills: 0 | Status: SHIPPED | OUTPATIENT
Start: 2024-06-19 | End: 2024-06-21 | Stop reason: SDUPTHER

## 2024-06-21 ENCOUNTER — OFFICE VISIT (OUTPATIENT)
Dept: INTERNAL MEDICINE | Facility: CLINIC | Age: 49
End: 2024-06-21
Payer: COMMERCIAL

## 2024-06-21 VITALS
OXYGEN SATURATION: 96 % | TEMPERATURE: 97.5 F | DIASTOLIC BLOOD PRESSURE: 86 MMHG | WEIGHT: 280 LBS | BODY MASS INDEX: 40.09 KG/M2 | HEART RATE: 68 BPM | SYSTOLIC BLOOD PRESSURE: 126 MMHG | HEIGHT: 70 IN

## 2024-06-21 DIAGNOSIS — L30.9 DERMATITIS: ICD-10-CM

## 2024-06-21 DIAGNOSIS — E78.00 HYPERCHOLESTEREMIA: Chronic | ICD-10-CM

## 2024-06-21 DIAGNOSIS — I10 HYPERTENSION, ESSENTIAL: Chronic | ICD-10-CM

## 2024-06-21 DIAGNOSIS — E11.9 TYPE 2 DIABETES MELLITUS WITHOUT COMPLICATION, WITHOUT LONG-TERM CURRENT USE OF INSULIN: Primary | Chronic | ICD-10-CM

## 2024-06-21 PROCEDURE — 99214 OFFICE O/P EST MOD 30 MIN: CPT | Performed by: NURSE PRACTITIONER

## 2024-06-21 RX ORDER — ATORVASTATIN CALCIUM 10 MG/1
10 TABLET, FILM COATED ORAL DAILY
Qty: 90 TABLET | Refills: 0 | Status: SHIPPED | OUTPATIENT
Start: 2024-06-21

## 2024-06-21 RX ORDER — LOSARTAN POTASSIUM 50 MG/1
50 TABLET ORAL DAILY
Qty: 90 TABLET | Refills: 0 | Status: SHIPPED | OUTPATIENT
Start: 2024-06-21

## 2024-06-21 RX ORDER — PIOGLITAZONEHYDROCHLORIDE 30 MG/1
30 TABLET ORAL DAILY
Qty: 90 TABLET | Refills: 0 | Status: SHIPPED | OUTPATIENT
Start: 2024-06-21

## 2024-06-21 RX ORDER — DOXYCYCLINE 100 MG/1
100 TABLET ORAL 2 TIMES DAILY
Qty: 20 TABLET | Refills: 0 | Status: SHIPPED | OUTPATIENT
Start: 2024-06-21 | End: 2024-07-01

## 2024-06-22 LAB
ALBUMIN SERPL-MCNC: 4.4 G/DL (ref 4.1–5.1)
ALBUMIN/CREAT UR: <4 MG/G CREAT (ref 0–29)
ALP SERPL-CCNC: 48 IU/L (ref 44–121)
ALT SERPL-CCNC: 26 IU/L (ref 0–44)
AST SERPL-CCNC: 15 IU/L (ref 0–40)
BILIRUB SERPL-MCNC: 0.5 MG/DL (ref 0–1.2)
BUN SERPL-MCNC: 14 MG/DL (ref 6–24)
BUN/CREAT SERPL: 19 (ref 9–20)
CALCIUM SERPL-MCNC: 9.7 MG/DL (ref 8.7–10.2)
CHLORIDE SERPL-SCNC: 100 MMOL/L (ref 96–106)
CO2 SERPL-SCNC: 23 MMOL/L (ref 20–29)
CREAT SERPL-MCNC: 0.75 MG/DL (ref 0.76–1.27)
CREAT UR-MCNC: 71.8 MG/DL
EGFRCR SERPLBLD CKD-EPI 2021: 111 ML/MIN/1.73
GLOBULIN SER CALC-MCNC: 2.5 G/DL (ref 1.5–4.5)
GLUCOSE SERPL-MCNC: 128 MG/DL (ref 70–99)
HBA1C MFR BLD: 7.5 % (ref 4.8–5.6)
MICROALBUMIN UR-MCNC: <3 UG/ML
POTASSIUM SERPL-SCNC: 4.6 MMOL/L (ref 3.5–5.2)
PROT SERPL-MCNC: 6.9 G/DL (ref 6–8.5)
SODIUM SERPL-SCNC: 138 MMOL/L (ref 134–144)

## 2024-06-22 NOTE — PROGRESS NOTES
"Chief Complaint  Diabetes (3 month follow up), Skin Problem (Concerned with healing), and Numbness (In toes)  Subjective        Darren Pulido presents to [facility]  Diabetes    Skin Problem      History of Present Illness  The patient presents for evaluation of multiple medical concerns.    The patient's father, who was 75 years old, recently passed away on 04/27/2024, after suffering a traumatic event involving a fall from a wheelchair and a leg fracture. He is now living alone and expresses concern about potential falls. He consistently wears shoes when ascending stairs. Denies balance changes.    He continues to c/o scattered skin lesions which are pruritic, has applied topical steroid cream without improvement. He frequently scratches them, particularly at night.    The patient has been adhering to his diabetes medication regimen, albeit with occasional missed doses of metformin in the evening. He c/o loose stools with metformin.    The patient has not yet completed the ColoGuard test but does have the kit to complete.    Objective   Vital Signs:  /86 (BP Location: Left arm, Patient Position: Sitting, Cuff Size: Large Adult)   Pulse 68   Temp 97.5 °F (36.4 °C)   Ht 177.8 cm (70\")   Wt 127 kg (280 lb)   SpO2 96%   BMI 40.18 kg/m²   Estimated body mass index is 40.18 kg/m² as calculated from the following:    Height as of this encounter: 177.8 cm (70\").    Weight as of this encounter: 127 kg (280 lb).            Physical Exam  Constitutional:       Appearance: He is well-developed. He is not ill-appearing.   HENT:      Head: Normocephalic.      Right Ear: Hearing, tympanic membrane and external ear normal.      Left Ear: Hearing, tympanic membrane and external ear normal.      Nose: Nose normal. No nasal deformity, mucosal edema or rhinorrhea.      Right Sinus: No maxillary sinus tenderness or frontal sinus tenderness.      Left Sinus: No maxillary sinus tenderness or " frontal sinus tenderness.      Mouth/Throat:      Dentition: Normal dentition.   Eyes:      General: Lids are normal.         Right eye: No discharge.         Left eye: No discharge.      Conjunctiva/sclera: Conjunctivae normal.      Right eye: No exudate.     Left eye: No exudate.  Neck:      Thyroid: No thyroid mass or thyromegaly.      Vascular: No carotid bruit.      Trachea: Trachea normal.   Cardiovascular:      Rate and Rhythm: Regular rhythm.      Pulses: Normal pulses.      Heart sounds: Normal heart sounds. No murmur heard.  Pulmonary:      Effort: No respiratory distress.      Breath sounds: Normal breath sounds. No decreased breath sounds, wheezing, rhonchi or rales.   Abdominal:      General: Bowel sounds are normal.      Palpations: Abdomen is soft.      Tenderness: There is no abdominal tenderness.   Musculoskeletal:      Cervical back: Normal range of motion. No edema.   Lymphadenopathy:      Head:      Right side of head: No submental, submandibular, tonsillar, preauricular, posterior auricular or occipital adenopathy.      Left side of head: No submental, submandibular, tonsillar, preauricular, posterior auricular or occipital adenopathy.   Skin:     General: Skin is warm and dry.      Findings: Rash present.      Nails: There is no clubbing.      Comments: Scattered macules and papules with signs of excoriation on bilateral upper extremities and upper back   Neurological:      Mental Status: He is alert.   Psychiatric:         Behavior: Behavior is cooperative.        Physical Exam      Result Review :  The following data was reviewed by: WADE Black on 06/21/2024:  Common labs          3/19/2024    16:12 6/21/2024    13:57   Common Labs   Glucose 163  128    BUN 10  14    Creatinine 0.91  0.75    Sodium 137  138    Potassium 4.5  4.6    Chloride 101  100    Calcium 9.8  9.7    Total Protein 7.2  6.9    Albumin 4.5  4.4    Total Bilirubin 0.5  0.5    Alkaline Phosphatase 68  48    AST  (SGOT) 15  15    ALT (SGPT) 25  26    WBC 10.14     Hemoglobin 16.6     Hematocrit 49.6     Platelets 309     Total Cholesterol 163     Triglycerides 101     HDL Cholesterol 53     LDL Cholesterol  92     Hemoglobin A1C 7.40  7.5    Microalbumin, Urine  <3.0           Results  Laboratory Studies  A1c was 7.4 on March 19.             Assessment and Plan   Diagnoses and all orders for this visit:    1. Type 2 diabetes mellitus without complication, without long-term current use of insulin (Primary)  Assessment & Plan:  His last A1c was elevated at 7.4 but he had been off his medication, will recheck today. However, he admits to frequently forgetting his 2nd dose of metformin (prefers to take in divided doses to manage GI side effects).    Orders:  -     pioglitazone (ACTOS) 30 MG tablet; Take 1 tablet by mouth Daily.  Dispense: 90 tablet; Refill: 0  -     Hemoglobin A1c  -     Comprehensive Metabolic Panel  -     Microalbumin / Creatinine Urine Ratio - Urine, Clean Catch    2. Dermatitis  -     Ambulatory Referral to Dermatology  -     doxycycline (ADOXA) 100 MG tablet; Take 1 tablet by mouth 2 (Two) Times a Day for 10 days.  Dispense: 20 tablet; Refill: 0    3. Hypertension, essential  Assessment & Plan:  Hypertension is stable and controlled  Continue current treatment regimen.  Blood pressure will be reassessed in 6 months.  Continue losartan along with a low sodium diet.    Orders:  -     losartan (COZAAR) 50 MG tablet; Take 1 tablet by mouth Daily.  Dispense: 90 tablet; Refill: 0    4. Hypercholesteremia  Assessment & Plan:  He denies myalgias with atorvastatin which he will continue along with a low-fat, low-cholesterol diet.   Lab Results   Component Value Date    LDL 92 03/19/2024       Orders:  -     atorvastatin (LIPITOR) 10 MG tablet; Take 1 tablet by mouth Daily.  Dispense: 90 tablet; Refill: 0      Assessment & Plan  Dermatitis  A prescription for doxycycline, to be taken twice daily for a duration of 10  days, has been issued. The patient has been advised to avoid sun exposure while on this medication. A referral to a dermatologist has been made. Also advised to wash daily with antibacterial soap and avoid scratching.      Colon cancer screening.  The patient has been advised to complete the ColoGuard test.         Follow Up   Return in about 3 months (around 9/21/2024).  Patient was given instructions and counseling regarding his condition or for health maintenance advice. Please see specific information pulled into the AVS if appropriate.     Patient or patient representative verbalized consent for the use of Ambient Listening during the visit with  WADE Black for chart documentation. 6/22/2024  10:02 EDT

## 2024-06-22 NOTE — ASSESSMENT & PLAN NOTE
He denies myalgias with atorvastatin which he will continue along with a low-fat, low-cholesterol diet.   Lab Results   Component Value Date    LDL 92 03/19/2024

## 2024-06-22 NOTE — ASSESSMENT & PLAN NOTE
Hypertension is stable and controlled  Continue current treatment regimen.  Blood pressure will be reassessed in 6 months.  Continue losartan along with a low sodium diet.

## 2024-06-22 NOTE — ASSESSMENT & PLAN NOTE
His last A1c was elevated at 7.4 but he had been off his medication, will recheck today. However, he admits to frequently forgetting his 2nd dose of metformin (prefers to take in divided doses to manage GI side effects).

## 2024-07-08 ENCOUNTER — TELEPHONE (OUTPATIENT)
Dept: INTERNAL MEDICINE | Facility: CLINIC | Age: 49
End: 2024-07-08
Payer: COMMERCIAL

## 2024-07-08 NOTE — TELEPHONE ENCOUNTER
Attempted to call patient, LVM requesting call back. Will post pone for reattempt    HUB TO READ  Marsha states your recent labs show an elevated glucose (A1c is 7.5, goal is <7). Are you taking your diabetic medications consistently? If so, we will titrate medications for better glucose control.

## 2024-07-08 NOTE — TELEPHONE ENCOUNTER
----- Message from Marsha Mchughjoesph sent at 7/7/2024  7:26 PM EDT -----  Please notify pt his recent labs show an elevated glucose (A1c is 7.5, goal is <7). Please clarify if he has taken his diabetic medications consistently. If so, we will titrate his medications for better glucose control.

## 2024-07-09 DIAGNOSIS — E11.9 TYPE 2 DIABETES MELLITUS WITHOUT COMPLICATION, WITHOUT LONG-TERM CURRENT USE OF INSULIN: Primary | ICD-10-CM

## 2024-07-09 RX ORDER — DAPAGLIFLOZIN 5 MG/1
5 TABLET, FILM COATED ORAL DAILY
Qty: 30 TABLET | Refills: 1 | Status: SHIPPED | OUTPATIENT
Start: 2024-07-09

## 2024-07-09 NOTE — TELEPHONE ENCOUNTER
His A1c was 7.5 which is elevated-if he is taking medications consistently I recommend adding Farxiga 5 mg daily. I have sent a prescription to the pharmacy. Please advise him he can printout a coupon for the co-pay on [email or web address]. Thanks.

## 2024-07-09 NOTE — TELEPHONE ENCOUNTER
Spoke with patient and advised. He states he would like Farxiga sent to [facility] off [address].

## 2024-07-09 NOTE — TELEPHONE ENCOUNTER
Spoke with patient - he states he has been taking it pretty consistently since his last appointment (has missed a couple days) but not before then. He states he hopes at his next appointment the numbers will improve as he will take his medication more consistently.

## 2024-09-06 DIAGNOSIS — E11.9 TYPE 2 DIABETES MELLITUS WITHOUT COMPLICATION, WITHOUT LONG-TERM CURRENT USE OF INSULIN: ICD-10-CM

## 2024-09-06 RX ORDER — DAPAGLIFLOZIN 5 MG/1
5 TABLET, FILM COATED ORAL DAILY
Qty: 30 TABLET | Refills: 1 | Status: SHIPPED | OUTPATIENT
Start: 2024-09-06

## 2024-09-15 DIAGNOSIS — E11.9 TYPE 2 DIABETES MELLITUS WITHOUT COMPLICATION, WITHOUT LONG-TERM CURRENT USE OF INSULIN: Chronic | ICD-10-CM

## 2024-09-16 RX ORDER — METFORMIN HCL 500 MG
2000 TABLET, EXTENDED RELEASE 24 HR ORAL
Qty: 360 TABLET | Refills: 1 | Status: SHIPPED | OUTPATIENT
Start: 2024-09-16

## 2024-09-25 ENCOUNTER — TELEPHONE (OUTPATIENT)
Dept: INTERNAL MEDICINE | Facility: CLINIC | Age: 49
End: 2024-09-25
Payer: COMMERCIAL

## 2024-09-25 NOTE — TELEPHONE ENCOUNTER
Hub staff attempted to follow warm transfer process and was unsuccessful     Caller: Darren Pulido    Relationship to patient: Self    Best call back number: [phone]     Patient is needing: TO RESCHEDULE 9/27/24 APPOINTMENT    PATIENT STATES ITS HIS BUSY SEASON AND ONLY HAVE NEXT WEEK AVAILABLE  WED AND THURS IS AVAILABLE     PATIENT WOULD LIKE A CALLBACK

## 2024-10-11 ENCOUNTER — OFFICE VISIT (OUTPATIENT)
Dept: INTERNAL MEDICINE | Facility: CLINIC | Age: 49
End: 2024-10-11
Payer: COMMERCIAL

## 2024-10-11 VITALS
SYSTOLIC BLOOD PRESSURE: 124 MMHG | HEART RATE: 65 BPM | WEIGHT: 264.4 LBS | DIASTOLIC BLOOD PRESSURE: 78 MMHG | OXYGEN SATURATION: 97 % | HEIGHT: 70 IN | BODY MASS INDEX: 37.85 KG/M2

## 2024-10-11 DIAGNOSIS — I10 HYPERTENSION, ESSENTIAL: Primary | Chronic | ICD-10-CM

## 2024-10-11 DIAGNOSIS — F43.21 GRIEF: ICD-10-CM

## 2024-10-11 DIAGNOSIS — Z12.11 SCREENING FOR COLON CANCER: ICD-10-CM

## 2024-10-11 DIAGNOSIS — Z23 NEED FOR INFLUENZA VACCINATION: ICD-10-CM

## 2024-10-11 DIAGNOSIS — E11.9 TYPE 2 DIABETES MELLITUS WITHOUT COMPLICATION, WITHOUT LONG-TERM CURRENT USE OF INSULIN: Chronic | ICD-10-CM

## 2024-10-11 DIAGNOSIS — E78.00 HYPERCHOLESTEREMIA: Chronic | ICD-10-CM

## 2024-10-11 PROCEDURE — 99396 PREV VISIT EST AGE 40-64: CPT | Performed by: NURSE PRACTITIONER

## 2024-10-11 PROCEDURE — 90471 IMMUNIZATION ADMIN: CPT | Performed by: NURSE PRACTITIONER

## 2024-10-11 PROCEDURE — 90656 IIV3 VACC NO PRSV 0.5 ML IM: CPT | Performed by: NURSE PRACTITIONER

## 2024-10-12 LAB
ALBUMIN SERPL-MCNC: 4.8 G/DL (ref 3.5–5.2)
ALBUMIN/GLOB SERPL: 1.9 G/DL
ALP SERPL-CCNC: 58 U/L (ref 39–117)
ALT SERPL-CCNC: 23 U/L (ref 1–41)
AST SERPL-CCNC: 18 U/L (ref 1–40)
BILIRUB SERPL-MCNC: 0.6 MG/DL (ref 0–1.2)
BUN SERPL-MCNC: 14 MG/DL (ref 6–20)
BUN/CREAT SERPL: 17.7 (ref 7–25)
CALCIUM SERPL-MCNC: 9.9 MG/DL (ref 8.6–10.5)
CHLORIDE SERPL-SCNC: 102 MMOL/L (ref 98–107)
CHOLEST SERPL-MCNC: 155 MG/DL (ref 0–200)
CO2 SERPL-SCNC: 27.4 MMOL/L (ref 22–29)
CREAT SERPL-MCNC: 0.79 MG/DL (ref 0.76–1.27)
EGFRCR SERPLBLD CKD-EPI 2021: 108.9 ML/MIN/1.73
ERYTHROCYTE [DISTWIDTH] IN BLOOD BY AUTOMATED COUNT: 12.5 % (ref 12.3–15.4)
GLOBULIN SER CALC-MCNC: 2.5 GM/DL
GLUCOSE SERPL-MCNC: 116 MG/DL (ref 65–99)
HBA1C MFR BLD: 6.9 % (ref 4.8–5.6)
HCT VFR BLD AUTO: 51.9 % (ref 37.5–51)
HDLC SERPL-MCNC: 42 MG/DL (ref 40–60)
HGB BLD-MCNC: 17.3 G/DL (ref 13–17.7)
LDLC SERPL CALC-MCNC: 93 MG/DL (ref 0–100)
MCH RBC QN AUTO: 29.7 PG (ref 26.6–33)
MCHC RBC AUTO-ENTMCNC: 33.3 G/DL (ref 31.5–35.7)
MCV RBC AUTO: 89 FL (ref 79–97)
PLATELET # BLD AUTO: 333 10*3/MM3 (ref 140–450)
POTASSIUM SERPL-SCNC: 4.8 MMOL/L (ref 3.5–5.2)
PROT SERPL-MCNC: 7.3 G/DL (ref 6–8.5)
RBC # BLD AUTO: 5.83 10*6/MM3 (ref 4.14–5.8)
SODIUM SERPL-SCNC: 142 MMOL/L (ref 136–145)
TRIGL SERPL-MCNC: 107 MG/DL (ref 0–150)
TSH SERPL DL<=0.005 MIU/L-ACNC: 1.93 UIU/ML (ref 0.27–4.2)
VLDLC SERPL CALC-MCNC: 20 MG/DL (ref 5–40)
WBC # BLD AUTO: 10.86 10*3/MM3 (ref 3.4–10.8)

## 2024-10-26 PROBLEM — F43.21 GRIEF: Status: ACTIVE | Noted: 2024-10-26

## 2024-10-26 NOTE — ASSESSMENT & PLAN NOTE
He has experienced significant personal loss, which may be contributing to his depressive symptoms. Counseling was recommended as a potential therapeutic approach. He expressed hesitation about starting medication for depression at this time-continue to monitor.

## 2024-10-26 NOTE — PROGRESS NOTES
"Chief Complaint  Diabetes and Hypertension (Follow up )  Subjective        Darren Pulido presents to [facility]  Diabetes    Hypertension      History of Present Illness  The patient presents for evaluation of multiple medical concerns.    He reports a loss of appetite and weight, attributing this to his lack of cooking skills and reliance on fast food. He has not been monitoring his blood sugar levels due to a lack of compatible test strips for his glucometer. His medication adherence is inconsistent, with occasional missed doses. He is currently taking metformin, pioglitazone, and Farxiga, the latter of which was restarted in 07/2024. He expresses concern about the cost of Farxiga, having paid $300 for a refill without any coupon. He admits to occasionally skipping the second dose of metformin.     He acknowledges that his poor eating habits are partly due to depression and partly due to his demanding work schedule. He has experienced significant personal loss, with the passing of his father and wife and lives alone. He has previously taken Lexapro, prescribed by his uncle who is a doctor, but discontinued it after nearly fainting. He also mentions that he tends to forget things when he is busy.      Objective   Vital Signs:  /78 (BP Location: Left arm, Patient Position: Sitting, Cuff Size: Adult)   Pulse 65   Ht 177.8 cm (70\")   Wt 120 kg (264 lb 6.4 oz)   SpO2 97%   BMI 37.94 kg/m²   Estimated body mass index is 37.94 kg/m² as calculated from the following:    Height as of this encounter: 177.8 cm (70\").    Weight as of this encounter: 120 kg (264 lb 6.4 oz).            Physical Exam  Constitutional:       Appearance: He is well-developed. He is not ill-appearing.   HENT:      Head: Normocephalic.      Right Ear: Hearing, tympanic membrane and external ear normal.      Left Ear: Hearing, tympanic membrane and external ear normal.      Nose: Nose normal. No nasal " deformity, mucosal edema or rhinorrhea.      Right Sinus: No maxillary sinus tenderness or frontal sinus tenderness.      Left Sinus: No maxillary sinus tenderness or frontal sinus tenderness.      Mouth/Throat:      Dentition: Normal dentition.   Eyes:      General: Lids are normal.         Right eye: No discharge.         Left eye: No discharge.      Conjunctiva/sclera: Conjunctivae normal.      Right eye: No exudate.     Left eye: No exudate.  Neck:      Thyroid: No thyroid mass or thyromegaly.      Vascular: No carotid bruit.      Trachea: Trachea normal.   Cardiovascular:      Rate and Rhythm: Regular rhythm.      Pulses: Normal pulses.      Heart sounds: Normal heart sounds. No murmur heard.  Pulmonary:      Effort: No respiratory distress.      Breath sounds: Normal breath sounds. No decreased breath sounds, wheezing, rhonchi or rales.   Abdominal:      General: Bowel sounds are normal.      Palpations: Abdomen is soft.      Tenderness: There is no abdominal tenderness.   Musculoskeletal:      Cervical back: Normal range of motion. No edema.   Lymphadenopathy:      Head:      Right side of head: No submental, submandibular, tonsillar, preauricular, posterior auricular or occipital adenopathy.      Left side of head: No submental, submandibular, tonsillar, preauricular, posterior auricular or occipital adenopathy.   Skin:     General: Skin is warm and dry.      Nails: There is no clubbing.   Neurological:      Mental Status: He is alert.   Psychiatric:         Behavior: Behavior is cooperative.        Physical Exam      Result Review :  The following data was reviewed by: WADE Black on 10/11/2024:  Common labs          3/19/2024    16:12 6/21/2024    13:57 10/11/2024    16:02   Common Labs   Glucose 163  128  116    BUN 10  14  14    Creatinine 0.91  0.75  0.79    Sodium 137  138  142    Potassium 4.5  4.6  4.8    Chloride 101  100  102    Calcium 9.8  9.7  9.9    Total Protein 7.2  6.9  7.3     Albumin 4.5  4.4  4.8    Total Bilirubin 0.5  0.5  0.6    Alkaline Phosphatase 68  48  58    AST (SGOT) 15  15  18    ALT (SGPT) 25  26  23    WBC 10.14   10.86    Hemoglobin 16.6   17.3    Hematocrit 49.6   51.9    Platelets 309   333    Total Cholesterol 163   155    Triglycerides 101   107    HDL Cholesterol 53   42    LDL Cholesterol  92   93    Hemoglobin A1C 7.40  7.5  6.90    Microalbumin, Urine  <3.0            Results  Laboratory Studies  A1c was 7.5.             Assessment and Plan   Diagnoses and all orders for this visit:    1. Hypertension, essential (Primary)  Assessment & Plan:  BP is well-controlled with losartan which he will continue along with a low sodium diet.    Orders:  -     Comprehensive Metabolic Panel  -     CBC (No Diff)  -     TSH    2. Hypercholesteremia  Assessment & Plan:  He denies myalgias with atorvastatin which he will continue along with a low-fat, low-cholesterol diet.   Lab Results   Component Value Date    LDL 93 10/11/2024       Orders:  -     Lipid Panel    3. Type 2 diabetes mellitus without complication, without long-term current use of insulin  Assessment & Plan:  His blood pressure readings are within normal range. However, his A1c level is elevated at 7.5, exceeding the target of 7.  He was advised to monitor his blood sugar levels regularly with the goal of taking medication more consistently. A prescription for a glucometer will be provided upon his request. He is currently taking metformin, pioglitazone, and Farxiga. He was advised that it is acceptable to take both doses of metformin together if he often forgets the second dose. A coupon for Farxiga was provided to reduce the cost.    Orders:  -     Hemoglobin A1c    4. Grief  Assessment & Plan:  He has experienced significant personal loss, which may be contributing to his depressive symptoms. Counseling was recommended as a potential therapeutic approach. He expressed hesitation about starting medication for  depression at this time-continue to monitor.      5. Screening for colon cancer  -     Cancel: Cologuard - Stool, Per Rectum; Future    6. Need for influenza vaccination  -     Fluzone >6mos      Assessment & Plan  Health Maintenance.  An influenza vaccine will be administered today. A Cologuard kit will be provided for colorectal cancer screening.         Follow Up   Return in about 3 months (around 1/11/2025).  Patient was given instructions and counseling regarding his condition or for health maintenance advice. Please see specific information pulled into the AVS if appropriate.     Patient or patient representative verbalized consent for the use of Ambient Listening during the visit with  WADE Black for chart documentation. 10/26/2024  13:04 EDT

## 2024-10-26 NOTE — ASSESSMENT & PLAN NOTE
His blood pressure readings are within normal range. However, his A1c level is elevated at 7.5, exceeding the target of 7.  He was advised to monitor his blood sugar levels regularly with the goal of taking medication more consistently. A prescription for a glucometer will be provided upon his request. He is currently taking metformin, pioglitazone, and Farxiga. He was advised that it is acceptable to take both doses of metformin together if he often forgets the second dose. A coupon for Farxiga was provided to reduce the cost.

## 2024-10-26 NOTE — ASSESSMENT & PLAN NOTE
He denies myalgias with atorvastatin which he will continue along with a low-fat, low-cholesterol diet.   Lab Results   Component Value Date    LDL 93 10/11/2024

## 2024-11-05 DIAGNOSIS — I10 HYPERTENSION, ESSENTIAL: Chronic | ICD-10-CM

## 2024-11-05 DIAGNOSIS — E11.9 TYPE 2 DIABETES MELLITUS WITHOUT COMPLICATION, WITHOUT LONG-TERM CURRENT USE OF INSULIN: Chronic | ICD-10-CM

## 2024-11-05 DIAGNOSIS — E78.00 HYPERCHOLESTEREMIA: Chronic | ICD-10-CM

## 2024-11-05 RX ORDER — PIOGLITAZONEHYDROCHLORIDE 30 MG/1
30 TABLET ORAL DAILY
Qty: 90 TABLET | Refills: 0 | Status: SHIPPED | OUTPATIENT
Start: 2024-11-05

## 2024-11-05 RX ORDER — LOSARTAN POTASSIUM 50 MG/1
50 TABLET ORAL DAILY
Qty: 90 TABLET | Refills: 0 | Status: SHIPPED | OUTPATIENT
Start: 2024-11-05

## 2024-11-05 RX ORDER — ATORVASTATIN CALCIUM 10 MG/1
10 TABLET, FILM COATED ORAL DAILY
Qty: 90 TABLET | Refills: 0 | Status: SHIPPED | OUTPATIENT
Start: 2024-11-05

## 2024-11-10 DIAGNOSIS — E11.9 TYPE 2 DIABETES MELLITUS WITHOUT COMPLICATION, WITHOUT LONG-TERM CURRENT USE OF INSULIN: ICD-10-CM

## 2024-11-11 RX ORDER — DAPAGLIFLOZIN 5 MG/1
5 TABLET, FILM COATED ORAL DAILY
Qty: 30 TABLET | Refills: 1 | Status: SHIPPED | OUTPATIENT
Start: 2024-11-11

## 2024-11-23 ENCOUNTER — TELEMEDICINE (OUTPATIENT)
Dept: FAMILY MEDICINE CLINIC | Facility: TELEHEALTH | Age: 49
End: 2024-11-23
Payer: COMMERCIAL

## 2024-11-23 DIAGNOSIS — H10.33 ACUTE BACTERIAL CONJUNCTIVITIS OF BOTH EYES: Primary | ICD-10-CM

## 2024-11-23 RX ORDER — POLYMYXIN B SULFATE AND TRIMETHOPRIM 1; 10000 MG/ML; [USP'U]/ML
1 SOLUTION OPHTHALMIC EVERY 6 HOURS
Qty: 10 ML | Refills: 0 | Status: SHIPPED | OUTPATIENT
Start: 2024-11-23 | End: 2024-11-30

## 2024-11-23 NOTE — PATIENT INSTRUCTIONS
Warm compresses to remove eye discharge and warm or cool compressed for comfort.   Wash hands before and after touching eye and instilling eye drops   If symptoms do not improve on 7 days or if symptoms worsen anytime follow up.     Bacterial Conjunctivitis, Adult  Bacterial conjunctivitis is an infection of your conjunctiva. This is the clear membrane that covers the white part of your eye and the inner part of your eyelid. This infection can make your eye:  Red or pink.  Itchy or irritated.  This condition spreads easily from person to person (is contagious) and from one eye to the other eye.  What are the causes?  This condition is caused by germs (bacteria). You may get the infection if you come into close contact with:  A person who has the infection.  Items that have germs on them (are contaminated), such as face towels, contact lens solution, or eye makeup.  What increases the risk?  You are more likely to get this condition if:  You have contact with people who have the infection.  You wear contact lenses.  You have a sinus infection.  You have had a recent eye injury or surgery.  You have a weak body defense system (immune system).  You have dry eyes.  What are the signs or symptoms?    Thick, yellowish discharge from the eye.  Tearing or watery eyes.  Itchy eyes.  Burning feeling in your eyes.  Eye redness.  Swollen eyelids.  Blurred vision.  How is this treated?    Antibiotic eye drops or ointment.  Antibiotic medicine taken by mouth. This is used for infections that do not get better with drops or ointment or that last more than 10 days.  Cool, wet cloths placed on the eyes.  Artificial tears used 2-6 times a day.  Follow these instructions at home:  Medicines  Take or apply your antibiotic medicine as told by your doctor. Do not stop using it even if you start to feel better.  Take or apply over-the-counter and prescription medicines only as told by your doctor.  Do not touch your eyelid with the  eye-drop bottle or the ointment tube.  Managing discomfort  Wipe any fluid from your eye with a warm, wet washcloth or a cotton ball.  Place a clean, cool, wet cloth on your eye. Do this for 10-20 minutes, 3-4 times a day.  General instructions  Do not wear contacts until the infection is gone. Wear glasses until your doctor says it is okay to wear contacts again.  Do not wear eye makeup until the infection is gone. Throw away old eye makeup.  Change or wash your pillowcase every day.  Do not share towels or washcloths.  Wash your hands often with soap and water for at least 20 seconds and especially before touching your face or eyes. Use paper towels to dry your hands.  Do not touch or rub your eyes.  Do not drive or use heavy machinery if your vision is blurred.  Contact a doctor if:  You have a fever.  You do not get better after 10 days.  Get help right away if:  You have a fever and your symptoms get worse all of a sudden.  You have very bad pain when you move your eye.  Your face:  Hurts.  Is red.  Is swollen.  You have sudden loss of vision.  Summary  Bacterial conjunctivitis is an infection of your conjunctiva.  This infection spreads easily from person to person.  Wash your hands often with soap and water for at least 20 seconds and especially before touching your face or eyes. Use paper towels to dry your hands.  Take or apply your antibiotic medicine as told by your doctor.  Contact a doctor if you have a fever or you do not get better after 10 days.  This information is not intended to replace advice given to you by your health care provider. Make sure you discuss any questions you have with your health care provider.  Document Revised: 03/30/2022 Document Reviewed: 03/30/2022  Elsevier Patient Education © 2024 [employer]

## 2024-11-23 NOTE — PROGRESS NOTES
"CHIEF COMPLAINT  Chief Complaint   Patient presents with    Conjunctivitis         HPI  Darren Pulido is a 49 y.o. male  presents with complaint of bilateral conjunctivitis.   Tuesday he developed a sore throat. He has had allergy symptoms all week.   On Friday he was working on his computer and his left eye was \"goopy,\" but he did not notice if it is red. He has been using allergy medications, but his eyes have gotten much worse. He woke up in the middle of the night and had to wipe the mucus out.   Today, his eyes are swollen, red and having a discharge. Discharge is pale green and is goopy.     Review of Systems   Constitutional:  Negative for chills, diaphoresis, fatigue and fever.   HENT:  Positive for congestion, rhinorrhea, sneezing and sore throat. Negative for sinus pressure and sinus pain.    Eyes:  Positive for pain, discharge and redness. Negative for photophobia, itching and visual disturbance.   Respiratory:  Positive for cough.        Past Medical History:   Diagnosis Date    Allergic rhinitis     Anxiety     Anxiety/ Stress    Bundle branch block     Right Bundle Branch Block on EKG    Fatigue     Fatigue/Lethargy    Gastroesophageal reflux     Hiatal hernia     History of EKG 06/21/2010    Insomnia     Low back pain     with sciatic component to right leg    Low back strain     Morbid obesity     Palpitation     Rotator cuff tear, right     Syncopal episodes     x 3 due to overheating at [employer]    Vasovagal episode     Vasovagal episodes    Weight loss     Intentional weight loss with Nutrasystem       Family History   Problem Relation Age of Onset    Hyperlipidemia Mother     Hypertension Mother     Diabetes type II Mother     Coronary artery disease Mother         Stent placement x3    Obesity Father     Hypertension Father     Diabetes type II Father     Kidney failure Father         received dialysis    Heart disease Maternal Grandmother     Heart disease Maternal Grandfather     " Ulcers Maternal Grandfather     Alzheimer's disease Paternal Grandmother     Bone cancer Paternal Grandfather     Coronary artery disease Maternal Uncle 52    Seizures Paternal Uncle     Other Paternal Uncle         Epilepsy    Depression Other        Social History     Socioeconomic History    Marital status:    Tobacco Use    Smoking status: Former     Current packs/day: 0.00     Average packs/day: 1 pack/day for 20.0 years (20.0 ttl pk-yrs)     Types: Cigarettes     Start date:      Quit date:      Years since quittin.9     Passive exposure: Never    Smokeless tobacco: Never   Vaping Use    Vaping status: Never Used   Substance and Sexual Activity    Alcohol use: Yes     Comment: occasionally         There were no vitals taken for this visit.    PHYSICAL EXAM  Physical Exam   Constitutional: He is oriented to person, place, and time. He appears well-developed and well-nourished. He does not have a sickly appearance. He does not appear ill. No distress.   HENT:   Head: Normocephalic and atraumatic.   Eyes: Pupils are equal, round, and reactive to light. EOM are normal. Right eye exhibits exudate and edema. Right eye exhibits no discharge and no hordeolum. No foreign body present in the right eye. Left eye exhibits exudate and edema. Left eye exhibits no discharge and no hordeolum. No foreign body present in the left eye. Eyelid: no right ptosis or left ptosis. Right conjunctiva is injected. Right conjunctiva has no hemorrhage. Left conjunctiva is injected. Left conjunctiva has no hemorrhage. No scleral icterus.       Neck: Neck normal appearance.  Pulmonary/Chest: Effort normal.  No respiratory distress.  Neurological: He is alert and oriented to person, place, and time.   Skin: Skin is dry.   Psychiatric: He has a normal mood and affect.           Diagnoses and all orders for this visit:    1. Acute bacterial conjunctivitis of both eyes (Primary)    Other orders  -     trimethoprim-polymyxin b  (POLYTRIM) 98378-3.1 UNIT/ML-% ophthalmic solution; Administer 1 drop to both eyes Every 6 (Six) Hours for 7 days.  Dispense: 10 mL; Refill: 0        Mode of visit: Video   Myself and Darren Pulido participated in this visit. The patient is located in [address] [place] [address]. I am located in [place], [place]. Mychart and Twilio were utilized.   You have chosen to receive care through a telehealth visit.    You have chosen to receive care through a telehealth visit.   Does the patient consent to use a video/audio connection for your medical care today? Yes       Note Disclaimer: At [facility], we believe that sharing information builds trust and better   relationships. You are receiving this note because you recently visited [facility]. It is possible you   will see health information before a provider has talked with you about it. This kind of information can   be easy to misunderstand. To help you fully understand what it means for your health, we urge you to   discuss this note with your provider.    Antonieta Eubanks, WADE  11/23/2024  17:26 EST

## 2024-11-27 ENCOUNTER — OFFICE VISIT (OUTPATIENT)
Dept: INTERNAL MEDICINE | Facility: CLINIC | Age: 49
End: 2024-11-27
Payer: COMMERCIAL

## 2024-11-27 VITALS
BODY MASS INDEX: 37.74 KG/M2 | WEIGHT: 263.6 LBS | HEART RATE: 77 BPM | HEIGHT: 70 IN | SYSTOLIC BLOOD PRESSURE: 120 MMHG | DIASTOLIC BLOOD PRESSURE: 72 MMHG | TEMPERATURE: 98.2 F | RESPIRATION RATE: 18 BRPM | OXYGEN SATURATION: 96 %

## 2024-11-27 DIAGNOSIS — J06.9 ACUTE URI: ICD-10-CM

## 2024-11-27 DIAGNOSIS — H61.21 IMPACTED CERUMEN OF RIGHT EAR: ICD-10-CM

## 2024-11-27 DIAGNOSIS — H10.33 ACUTE BACTERIAL CONJUNCTIVITIS OF BOTH EYES: Primary | ICD-10-CM

## 2024-11-27 PROCEDURE — 99213 OFFICE O/P EST LOW 20 MIN: CPT | Performed by: NURSE PRACTITIONER

## 2024-12-01 NOTE — PROGRESS NOTES
"Chief Complaint  Sore Throat and Tinnitus  Subjective        Advent White presents to [facility]  Sore Throat       Symptoms include sore throat.      History of Present Illness  The patient presents for evaluation of multiple medical concerns.    He experienced a moderate sore throat last Monday which resolved by Wednesday. However, he developed congestion on Thursday night, for which he took an OTC antihistamine. On Friday, he noticed difficulty focusing on the screen at work and observed that his left eye appeared more droopy than the right. By Friday night, both eyes had become red. He sought help from telehealth on Saturday. He also mentions that he had to clean his eyes twice during the night due to drainage, primarily from the left eye. He continues to use the Polytrim eyedrops prescribed 11/23/24. He has been experiencing head congestion, with discharge from his eyes, nose, and mouth. He reports no fever or body aches. He visited an eye doctor on Monday and has an appointment scheduled for December 4, 2024. Denies fever and/or chills. No dyspnea.    He expresses concern about his ears reporting a slight ringing sound and a perception that music sounds distant.     Objective   Vital Signs:  /72 (BP Location: Left arm)   Pulse 77   Temp 98.2 °F (36.8 °C) (Oral)   Resp 18   Ht 177.8 cm (70\")   Wt 120 kg (263 lb 9.6 oz)   SpO2 96%   BMI 37.82 kg/m²   Estimated body mass index is 37.82 kg/m² as calculated from the following:    Height as of this encounter: 177.8 cm (70\").    Weight as of this encounter: 120 kg (263 lb 9.6 oz).            Physical Exam  HENT:      Head: Normocephalic.      Ears:      Comments: +excessive cerumen right ear canal     Nose: Nose normal.      Mouth/Throat:      Pharynx: Posterior oropharyngeal erythema present.   Eyes:      General:         Right eye: No discharge.         Left eye: No discharge.      Conjunctiva/sclera: Conjunctivae " normal.   Cardiovascular:      Pulses: Normal pulses.      Heart sounds: Normal heart sounds. No murmur heard.  Pulmonary:      Effort: No respiratory distress.      Breath sounds: Normal breath sounds. No wheezing, rhonchi or rales.   Musculoskeletal:      Cervical back: Normal range of motion.   Lymphadenopathy:      Cervical: No cervical adenopathy.   Skin:     General: Skin is warm and dry.   Neurological:      Mental Status: He is alert.        Physical Exam  Right ear is completely impacted with cerumen. Left ear is clear.    Result Review :      Data reviewed : Consultant notes telemedicine 11/23/24      Results               Assessment and Plan   Diagnoses and all orders for this visit:    1. Acute bacterial conjunctivitis of both eyes (Primary)    2. Impacted cerumen of right ear    3. Acute URI      Assessment & Plan    1. Conjunctivitis.  The patient reported redness and drainage from both eyes, with the left eye being more affected. He is currently using Polytrim eye drops, conjunctiva is clear in office today. He has a f/u appt with his opth next week, continue to monitor.    2. Cerumen impaction, right ear.  Symptoms of ear fullness and tinnitus are likely due to cerumen impaction in the right ear. Debrox was recommended to soften the cerumen. RTC for ear irrigation if sx do not resolve.    3. Upper respiratory infection.  The patient experienced a sore throat, congestion, and a cough with some expectoration. He was advised to monitor symptoms and use over-the-counter Mucinex for increased sputum production. RTC if sx persist or worsen.             Follow Up   Return if symptoms worsen or fail to improve, for Next scheduled follow up.  Patient was given instructions and counseling regarding his condition or for health maintenance advice. Please see specific information pulled into the AVS if appropriate.     Patient or patient representative verbalized consent for the use of Ambient Listening during  the visit with  WADE Black for chart documentation. 11/30/2024  19:20 EST

## 2025-01-24 ENCOUNTER — OFFICE VISIT (OUTPATIENT)
Dept: INTERNAL MEDICINE | Facility: CLINIC | Age: 50
End: 2025-01-24
Payer: COMMERCIAL

## 2025-01-24 VITALS
SYSTOLIC BLOOD PRESSURE: 120 MMHG | HEART RATE: 80 BPM | OXYGEN SATURATION: 95 % | TEMPERATURE: 97.8 F | BODY MASS INDEX: 37.59 KG/M2 | HEIGHT: 70 IN | RESPIRATION RATE: 20 BRPM | WEIGHT: 262.6 LBS | DIASTOLIC BLOOD PRESSURE: 80 MMHG

## 2025-01-24 DIAGNOSIS — I45.10 RIGHT BUNDLE BRANCH BLOCK (RBBB): Chronic | ICD-10-CM

## 2025-01-24 DIAGNOSIS — E11.65 TYPE 2 DIABETES MELLITUS WITH HYPERGLYCEMIA, WITHOUT LONG-TERM CURRENT USE OF INSULIN: ICD-10-CM

## 2025-01-24 DIAGNOSIS — Z12.11 SCREENING FOR COLON CANCER: ICD-10-CM

## 2025-01-24 DIAGNOSIS — R55 SYNCOPE, UNSPECIFIED SYNCOPE TYPE: Primary | ICD-10-CM

## 2025-01-24 LAB
ALBUMIN SERPL-MCNC: 4.5 G/DL (ref 3.5–5.2)
ALBUMIN/GLOB SERPL: 1.5 G/DL
ALP SERPL-CCNC: 56 U/L (ref 39–117)
ALT SERPL-CCNC: 18 U/L (ref 1–41)
AST SERPL-CCNC: 15 U/L (ref 1–40)
BILIRUB SERPL-MCNC: 0.6 MG/DL (ref 0–1.2)
BUN SERPL-MCNC: 13 MG/DL (ref 6–20)
BUN/CREAT SERPL: 15.3 (ref 7–25)
CALCIUM SERPL-MCNC: 10.2 MG/DL (ref 8.6–10.5)
CHLORIDE SERPL-SCNC: 96 MMOL/L (ref 98–107)
CO2 SERPL-SCNC: 27.6 MMOL/L (ref 22–29)
CREAT SERPL-MCNC: 0.85 MG/DL (ref 0.76–1.27)
EGFRCR SERPLBLD CKD-EPI 2021: 105.9 ML/MIN/1.73
ERYTHROCYTE [DISTWIDTH] IN BLOOD BY AUTOMATED COUNT: 13.5 % (ref 12.3–15.4)
GLOBULIN SER CALC-MCNC: 3 GM/DL
GLUCOSE SERPL-MCNC: 136 MG/DL (ref 65–99)
HBA1C MFR BLD: 6.9 % (ref 4.8–5.6)
HCT VFR BLD AUTO: 48.1 % (ref 37.5–51)
HGB BLD-MCNC: 16.2 G/DL (ref 13–17.7)
MCH RBC QN AUTO: 30.1 PG (ref 26.6–33)
MCHC RBC AUTO-ENTMCNC: 33.7 G/DL (ref 31.5–35.7)
MCV RBC AUTO: 89.4 FL (ref 79–97)
PLATELET # BLD AUTO: 353 10*3/MM3 (ref 140–450)
POTASSIUM SERPL-SCNC: 4.7 MMOL/L (ref 3.5–5.2)
PROT SERPL-MCNC: 7.5 G/DL (ref 6–8.5)
RBC # BLD AUTO: 5.38 10*6/MM3 (ref 4.14–5.8)
SODIUM SERPL-SCNC: 142 MMOL/L (ref 136–145)
TSH SERPL DL<=0.005 MIU/L-ACNC: 1.15 UIU/ML (ref 0.27–4.2)
WBC # BLD AUTO: 11.48 10*3/MM3 (ref 3.4–10.8)

## 2025-01-24 RX ORDER — LANCETS
1 EACH MISCELLANEOUS DAILY
Qty: 100 EACH | Refills: 1 | Status: SHIPPED | OUTPATIENT
Start: 2025-01-24

## 2025-02-04 ENCOUNTER — HOSPITAL ENCOUNTER (OUTPATIENT)
Dept: CARDIOLOGY | Facility: HOSPITAL | Age: 50
Discharge: HOME | End: 2025-02-04
Payer: COMMERCIAL

## 2025-02-04 VITALS
DIASTOLIC BLOOD PRESSURE: 84 MMHG | WEIGHT: 262 LBS | HEIGHT: 70 IN | BODY MASS INDEX: 37.51 KG/M2 | SYSTOLIC BLOOD PRESSURE: 120 MMHG

## 2025-02-04 DIAGNOSIS — R55 SYNCOPE, UNSPECIFIED SYNCOPE TYPE: ICD-10-CM

## 2025-02-04 LAB
ASCENDING AORTA: 3.3 CM
AV MEAN PRESS GRAD SYS DOP V1V2: 4.7 MMHG
AV VMAX SYS DOP: 159.3 CM/SEC
BH CV ECHO MEAS - ACS: 2.31 CM
BH CV ECHO MEAS - AO MAX PG: 10.1 MMHG
BH CV ECHO MEAS - AO ROOT DIAM: 3.7 CM
BH CV ECHO MEAS - AO V2 VTI: 28 CM
BH CV ECHO MEAS - AVA(I,D): 2.24 CM2
BH CV ECHO MEAS - EDV(CUBED): 102.9 ML
BH CV ECHO MEAS - EDV(MOD-SP2): 93 ML
BH CV ECHO MEAS - EDV(MOD-SP4): 115 ML
BH CV ECHO MEAS - EF(MOD-SP2): 65.6 %
BH CV ECHO MEAS - EF(MOD-SP4): 62.6 %
BH CV ECHO MEAS - ESV(CUBED): 25.8 ML
BH CV ECHO MEAS - ESV(MOD-SP2): 32 ML
BH CV ECHO MEAS - ESV(MOD-SP4): 43 ML
BH CV ECHO MEAS - FS: 36.9 %
BH CV ECHO MEAS - IVS/LVPW: 1.01 CM
BH CV ECHO MEAS - IVSD: 1.06 CM
BH CV ECHO MEAS - LAT PEAK E' VEL: 10.1 CM/SEC
BH CV ECHO MEAS - LV DIASTOLIC VOL/BSA (35-75): 49.1 CM2
BH CV ECHO MEAS - LV MASS(C)D: 174.4 GRAMS
BH CV ECHO MEAS - LV MAX PG: 3.5 MMHG
BH CV ECHO MEAS - LV MEAN PG: 1.73 MMHG
BH CV ECHO MEAS - LV SYSTOLIC VOL/BSA (12-30): 18.4 CM2
BH CV ECHO MEAS - LV V1 MAX: 94 CM/SEC
BH CV ECHO MEAS - LV V1 VTI: 20.2 CM
BH CV ECHO MEAS - LVIDD: 4.7 CM
BH CV ECHO MEAS - LVIDS: 3 CM
BH CV ECHO MEAS - LVOT AREA: 3.1 CM2
BH CV ECHO MEAS - LVOT DIAM: 1.98 CM
BH CV ECHO MEAS - LVPWD: 1.04 CM
BH CV ECHO MEAS - MED PEAK E' VEL: 9.5 CM/SEC
BH CV ECHO MEAS - MV A DUR: 0.13 SEC
BH CV ECHO MEAS - MV A MAX VEL: 63.1 CM/SEC
BH CV ECHO MEAS - MV DEC SLOPE: 189.9 CM/SEC2
BH CV ECHO MEAS - MV DEC TIME: 0.26 SEC
BH CV ECHO MEAS - MV E MAX VEL: 69.8 CM/SEC
BH CV ECHO MEAS - MV E/A: 1.11
BH CV ECHO MEAS - MV MAX PG: 2.9 MMHG
BH CV ECHO MEAS - MV MEAN PG: 1.37 MMHG
BH CV ECHO MEAS - MV P1/2T: 116 MSEC
BH CV ECHO MEAS - MV V2 VTI: 27.8 CM
BH CV ECHO MEAS - MVA(P1/2T): 1.9 CM2
BH CV ECHO MEAS - MVA(VTI): 2.25 CM2
BH CV ECHO MEAS - PA ACC TIME: 0.11 SEC
BH CV ECHO MEAS - PA V2 MAX: 115.4 CM/SEC
BH CV ECHO MEAS - PULM A REVS DUR: 0.12 SEC
BH CV ECHO MEAS - PULM A REVS VEL: 27.5 CM/SEC
BH CV ECHO MEAS - PULM DIAS VEL: 48.7 CM/SEC
BH CV ECHO MEAS - PULM S/D: 1.41
BH CV ECHO MEAS - PULM SYS VEL: 68.4 CM/SEC
BH CV ECHO MEAS - RV MAX PG: 3.1 MMHG
BH CV ECHO MEAS - RV V1 MAX: 88.7 CM/SEC
BH CV ECHO MEAS - RV V1 VTI: 16.4 CM
BH CV ECHO MEAS - SV(LVOT): 62.5 ML
BH CV ECHO MEAS - SV(MOD-SP2): 61 ML
BH CV ECHO MEAS - SV(MOD-SP4): 72 ML
BH CV ECHO MEAS - SVI(LVOT): 26.7 ML/M2
BH CV ECHO MEAS - SVI(MOD-SP2): 26.1 ML/M2
BH CV ECHO MEAS - SVI(MOD-SP4): 30.8 ML/M2
BH CV ECHO MEAS - TAPSE (>1.6): 2.28 CM
BH CV ECHO MEASUREMENTS AVERAGE E/E' RATIO: 7.12
BH CV XLRA - RV BASE: 3.4 CM
BH CV XLRA - RV LENGTH: 6.8 CM
BH CV XLRA - RV MID: 2.9 CM
LEFT ATRIUM VOLUME INDEX: 19.9 ML/M2
LV EF BIPLANE MOD: 65 %
SINUS: 3.5 CM
STJ: 2.8 CM

## 2025-02-04 PROCEDURE — 93306 TTE W/DOPPLER COMPLETE: CPT

## 2025-02-04 PROCEDURE — 93242 EXT ECG>48HR<7D RECORDING: CPT

## 2025-02-17 ENCOUNTER — PATIENT MESSAGE (OUTPATIENT)
Dept: INTERNAL MEDICINE | Facility: CLINIC | Age: 50
End: 2025-02-17
Payer: COMMERCIAL

## 2025-02-17 DIAGNOSIS — E11.9 TYPE 2 DIABETES MELLITUS WITHOUT COMPLICATION, WITHOUT LONG-TERM CURRENT USE OF INSULIN: Chronic | ICD-10-CM

## 2025-02-17 LAB
CV ZIO BASELINE AVG BPM: 73 BPM
CV ZIO BASELINE BPM HIGH: 158 BPM
CV ZIO BASELINE BPM LOW: 42 BPM
CV ZIO DEVICE ANALYSIS TIME: NORMAL
CV ZIO ECT SVE COUNT: 95 EPISODES
CV ZIO ECT SVE CPLT COUNT: 0 EPISODES
CV ZIO ECT SVE CPLT FREQ: 0
CV ZIO ECT SVE FREQ: NORMAL
CV ZIO ECT SVE TPLT COUNT: 0 EPISODES
CV ZIO ECT SVE TPLT FREQ: 0
CV ZIO ECT VE COUNT: 22 EPISODES
CV ZIO ECT VE CPLT COUNT: 0 EPISODES
CV ZIO ECT VE CPLT FREQ: 0
CV ZIO ECT VE FREQ: NORMAL
CV ZIO ECT VE TPLT COUNT: 0 EPISODES
CV ZIO ECT VE TPLT FREQ: 0
CV ZIO ECTOPIC SVE COUPLET RAW PERCENT: 0 %
CV ZIO ECTOPIC SVE ISOLATED PERCENT: 0.02 %
CV ZIO ECTOPIC SVE TRIPLET RAW PERCENT: 0 %
CV ZIO ECTOPIC VE COUPLET RAW PERCENT: 0 %
CV ZIO ECTOPIC VE ISOLATED PERCENT: 0 %
CV ZIO ECTOPIC VE TRIPLET RAW PERCENT: 0 %
CV ZIO ENROLLMENT END: NORMAL
CV ZIO ENROLLMENT START: NORMAL
CV ZIO PATIENT EVENTS DIARIES: 4
CV ZIO PATIENT EVENTS TRIGGERS: 1
CV ZIO PAUSE COUNT: 0
CV ZIO PRESCRIPTION STATUS: NORMAL
CV ZIO SVT COUNT: 0
CV ZIO TOTAL  ENROLLMENT PERIOD: NORMAL
CV ZIO VT AVG BPM: 134 BPM
CV ZIO VT BPM HIGH: 158 BPM
CV ZIO VT BPM LOW: 95 BPM
CV ZIO VT COUNT: 1
CV ZIO VT F EPI AVG BPM: 134
CV ZIO VT F EPI BEATS: 9 BEATS
CV ZIO VT F EPI BPM HIGH: 158
CV ZIO VT F EPI BPM LOW: 95
CV ZIO VT F EPI DUR: 4.2 SEC
CV ZIO VT F EPI END: NORMAL
CV ZIO VT F EPI START: NORMAL
CV ZIO VT L EPI AVG BPM: 134
CV ZIO VT L EPI BEATS: 9 BEATS
CV ZIO VT L EPI BPM HIGH: 158 BPM
CV ZIO VT L EPI BPM LOW: 95 BPM
CV ZIO VT L EPI DUR: 4.2
CV ZIO VT L EPI END: NORMAL
CV ZIO VT L EPI START: NORMAL

## 2025-02-17 NOTE — ASSESSMENT & PLAN NOTE
His A1c level was previously recorded at 6.9, which is slightly elevated but trending in the right direction. A re-evaluation of his A1c level will be performed today. A prescription for an Accu-Chek Guide glucometer will be provided to help manage his blood glucose levels.

## 2025-02-17 NOTE — ASSESSMENT & PLAN NOTE
His blood pressure remains stable upon standing, indicating no orthostatic hypotension. The EKG reveals a right bundle branch block, which is not a new finding and is unlikely to be the cause of his syncope. The syncopal episodes could potentially be attributed to vagal nerve activation, particularly during episodes of diarrhea. However, further investigations are warranted to rule out other potential causes. An echocardiogram will be conducted to evaluate cardiac function, and a Holter monitor will be utilized to detect any irregular heart rhythms that could contribute to syncope

## 2025-02-17 NOTE — PROGRESS NOTES
Chief Complaint  Diabetes, Hypertension, and Loss of Consciousness  Andrew Pulido presents to [facility]  History of Present Illness  History of Present Illness  The patient presents for evaluation of syncope, diabetes, and numbness in the big toe.    He reports an improvement in his condition following a syncopal episode that resulted in facial trauma, specifically swelling in the right eye. He has a history of syncope, with one episode occurring before the age of 40 and approximately six or seven episodes thereafter. The most recent episode, which occurred at 9 PM, was the first since the death of his father on 04/27/2023. Prior to this episode, he experienced mild left arm pain but did not suspect a cardiac event due to the absence of radiating pain. He recalls feeling weak and attempting to consume orange juice, but lost consciousness before reaching the stairs. He estimates the duration of unconsciousness to be one to two minutes.     Upon regaining consciousness, he experienced a headache and facial pain, particularly when washing his face. He has been compliant with his medication regimen for the past several months. He reports no alcohol consumption prior to the episode and admits to possible dehydration. His blood pressure was normal when checked by his uncle an hour after the episode. He has been making efforts to maintain adequate hydration and has been adhering to a heart-healthy diet.     He occasionally experiences palpitations and irregular heartbeats. He reports no edema.     He reports feeling well overall but expresses anxiety about potential health complications. He recalls a previous episode of syncope that was preceded by diarrhea and weakness. During this episode, he also experienced vomiting. He was caring for his father at the time and was unable to descend the stairs to rest due to weakness. He managed to reach a chair where he rested for  "approximately 20 minutes. He also experienced abdominal pain during this episode.    He has not received a new ColoGuard test kit, with the last one being over a year ago.    He reports intermittent numbness in his big toe, which he attributes to nerve damage. He recalls an incident at work when a michael rolled over his toe, causing a popping sensation. He first noticed the numbness one to two years ago, describing it as a stabbing sensation.    He is planning to get a new blood sugar monitor after this visit and is requesting a prescription for the same.    Objective   Vital Signs:  /80 (BP Location: Right arm)   Pulse 80   Temp 97.8 °F (36.6 °C) (Oral)   Resp 20   Ht 177.8 cm (70\")   Wt 119 kg (262 lb 9.6 oz)   SpO2 95%   BMI 37.68 kg/m²   Estimated body mass index is 37.68 kg/m² as calculated from the following:    Height as of this encounter: 177.8 cm (70\").    Weight as of this encounter: 119 kg (262 lb 9.6 oz).     BP supine 120/80, sitting 130/92, standing 110/90 left arm       Physical Exam  Constitutional:       Appearance: He is well-developed. He is not ill-appearing.   HENT:      Head: Normocephalic.      Right Ear: Hearing, tympanic membrane and external ear normal.      Left Ear: Hearing, tympanic membrane and external ear normal.      Nose: Nose normal. No nasal deformity, mucosal edema or rhinorrhea.      Right Sinus: No maxillary sinus tenderness or frontal sinus tenderness.      Left Sinus: No maxillary sinus tenderness or frontal sinus tenderness.      Mouth/Throat:      Dentition: Normal dentition.   Eyes:      General: Lids are normal.         Right eye: No discharge.         Left eye: No discharge.      Conjunctiva/sclera: Conjunctivae normal.      Right eye: No exudate.     Left eye: No exudate.  Neck:      Thyroid: No thyroid mass or thyromegaly.      Vascular: No carotid bruit.      Trachea: Trachea normal.   Cardiovascular:      Rate and Rhythm: Regular rhythm.      Pulses: " Normal pulses.      Heart sounds: Normal heart sounds. No murmur heard.  Pulmonary:      Effort: No respiratory distress.      Breath sounds: Normal breath sounds. No decreased breath sounds, wheezing, rhonchi or rales.   Abdominal:      General: Bowel sounds are normal.      Palpations: Abdomen is soft.      Tenderness: There is no abdominal tenderness.   Musculoskeletal:      Cervical back: Normal range of motion. No edema.   Lymphadenopathy:      Head:      Right side of head: No submental, submandibular, tonsillar, preauricular, posterior auricular or occipital adenopathy.      Left side of head: No submental, submandibular, tonsillar, preauricular, posterior auricular or occipital adenopathy.   Skin:     General: Skin is warm and dry.      Nails: There is no clubbing.   Neurological:      Mental Status: He is alert.   Psychiatric:         Behavior: Behavior is cooperative.        Physical Exam      Result Review :  The following data was reviewed by: WADE Black on 01/24/2025:  Common labs          6/21/2024    13:57 10/11/2024    16:02 1/24/2025    09:36   Common Labs   Glucose 128  116  136    BUN 14  14  13    Creatinine 0.75  0.79  0.85    Sodium 138  142  142    Potassium 4.6  4.8  4.7    Chloride 100  102  96    Calcium 9.7  9.9  10.2    Albumin 4.4  4.8  4.5    Total Bilirubin 0.5  0.6  0.6    Alkaline Phosphatase 48  58  56    AST (SGOT) 15  18  15    ALT (SGPT) 26  23  18    WBC  10.86  11.48    Hemoglobin  17.3  16.2    Hematocrit  51.9  48.1    Platelets  333  353    Total Cholesterol  155     Triglycerides  107     HDL Cholesterol  42     LDL Cholesterol   93     Hemoglobin A1C 7.5  6.90  6.90    Microalbumin, Urine <3.0             Results  Laboratory Studies  A1c was 6.9. Cholesterol was good. Blood count was normal.    Testing  EKG shows right bundle branch block.        ECG 12 Lead    Date/Time: 1/24/2025 8:15 AM  Performed by: Marti Hopson    Authorized by: Marsha Daley APRN   Comparison: not compared with previous ECG   Previous ECG: no previous ECG available  Rhythm: sinus rhythm  Rate: normal  BPM: 61  Conduction: right bundle branch block  ST Segments: ST segments normal  T Waves: T waves normal  QRS axis: normal    Clinical impression: normal ECG            Assessment and Plan   Diagnoses and all orders for this visit:    1. Syncope, unspecified syncope type (Primary)  Assessment & Plan:  His blood pressure remains stable upon standing, indicating no orthostatic hypotension. The EKG reveals a right bundle branch block, which is not a new finding and is unlikely to be the cause of his syncope. The syncopal episodes could potentially be attributed to vagal nerve activation, particularly during episodes of diarrhea. However, further investigations are warranted to rule out other potential causes. An echocardiogram will be conducted to evaluate cardiac function, and a Holter monitor will be utilized to detect any irregular heart rhythms that could contribute to syncope    Orders:  -     Adult Transthoracic Echo Complete W/ Cont if Necessary Per Protocol; Future  -     Holter Monitor - 72 Hour Up To 15 Days; Future  -     Ambulatory Referral to Cardiology    2. Type 2 diabetes mellitus without complication, without long-term current use of insulin ([facility])  Assessment & Plan:  His A1c level was previously recorded at 6.9, which is slightly elevated but trending in the right direction. A re-evaluation of his A1c level will be performed today. A prescription for an Accu-Chek Guide glucometer will be provided to help manage his blood glucose levels.    Orders:  -     Accu-Chek FastClix Lancets misc; Use 1 Units Daily.  Dispense: 100 each; Refill: 1  -     Hemoglobin A1c  -     Comprehensive Metabolic Panel  -     CBC (No Diff)  -     TSH    3. Right bundle branch block (RBBB)  Assessment & Plan:  Noted on EKG performed in office today, has been noted previously    Orders:  -     Ambulatory  Referral to Cardiology    4. Screening for colon cancer  -     Cologuard - Stool, Per Rectum; Future    Other orders  -     ECG 12 Lead      Assessment & Plan   Numbness in the big toe.  He reports numbness in his big toe, which he attributes to nerve damage from an old injury. No immediate intervention is planned, but monitoring for any changes or worsening symptoms is advised.    Health Maintenance.  He has not received a new ColoGuard test kit. This will be communicated to the appropriate party to ensure he receives a new kit for colorectal cancer screening.           Follow Up   Return in about 4 weeks (around 2/21/2025).  Patient was given instructions and counseling regarding his condition or for health maintenance advice. Please see specific information pulled into the AVS if appropriate.     Patient or patient representative verbalized consent for the use of Ambient Listening during the visit with  WADE Black for chart documentation. 2/16/2025  20:16 EST

## 2025-02-20 ENCOUNTER — TELEPHONE (OUTPATIENT)
Dept: CARDIOLOGY | Facility: CLINIC | Age: 50
End: 2025-02-20
Payer: COMMERCIAL

## 2025-02-20 ENCOUNTER — OFFICE VISIT (OUTPATIENT)
Dept: INTERNAL MEDICINE | Facility: CLINIC | Age: 50
End: 2025-02-20
Payer: COMMERCIAL

## 2025-02-20 VITALS
OXYGEN SATURATION: 95 % | WEIGHT: 260.6 LBS | DIASTOLIC BLOOD PRESSURE: 78 MMHG | BODY MASS INDEX: 37.31 KG/M2 | SYSTOLIC BLOOD PRESSURE: 118 MMHG | HEIGHT: 70 IN | HEART RATE: 60 BPM

## 2025-02-20 DIAGNOSIS — R06.83 SNORING: ICD-10-CM

## 2025-02-20 DIAGNOSIS — E11.9 TYPE 2 DIABETES MELLITUS WITHOUT COMPLICATION, WITHOUT LONG-TERM CURRENT USE OF INSULIN: Chronic | ICD-10-CM

## 2025-02-20 DIAGNOSIS — I10 HYPERTENSION, ESSENTIAL: Chronic | ICD-10-CM

## 2025-02-20 DIAGNOSIS — F41.9 ANXIETY: Primary | ICD-10-CM

## 2025-02-20 DIAGNOSIS — R55 SYNCOPE, UNSPECIFIED SYNCOPE TYPE: Chronic | ICD-10-CM

## 2025-02-20 RX ORDER — ESCITALOPRAM OXALATE 10 MG/1
10 TABLET ORAL DAILY
Qty: 30 TABLET | Refills: 1 | Status: SHIPPED | OUTPATIENT
Start: 2025-02-20

## 2025-02-20 NOTE — TELEPHONE ENCOUNTER
Patient called to get a new pt appt.  He was referred by his PCP Marsha OLVERA for abnormal holter.  I offered him an appt as soon as next Tuesday.  Due to his work schedule, he was scheduled 3/7 at 0830 with Dr. Plata at the main office.    Halle Escamilla RN  [facility]  02/20/25 08:49 EST

## 2025-03-07 ENCOUNTER — OFFICE VISIT (OUTPATIENT)
Dept: CARDIOLOGY | Facility: CLINIC | Age: 50
End: 2025-03-07
Payer: COMMERCIAL

## 2025-03-07 VITALS
WEIGHT: 259.8 LBS | OXYGEN SATURATION: 97 % | HEART RATE: 70 BPM | DIASTOLIC BLOOD PRESSURE: 78 MMHG | BODY MASS INDEX: 37.19 KG/M2 | HEIGHT: 70 IN | SYSTOLIC BLOOD PRESSURE: 118 MMHG

## 2025-03-07 DIAGNOSIS — I47.29 NSVT (NONSUSTAINED VENTRICULAR TACHYCARDIA): ICD-10-CM

## 2025-03-07 DIAGNOSIS — R06.09 DYSPNEA ON EXERTION: ICD-10-CM

## 2025-03-07 DIAGNOSIS — E11.9 TYPE 2 DIABETES MELLITUS WITHOUT COMPLICATION, WITHOUT LONG-TERM CURRENT USE OF INSULIN: Chronic | ICD-10-CM

## 2025-03-07 DIAGNOSIS — R55 SYNCOPE AND COLLAPSE: ICD-10-CM

## 2025-03-07 DIAGNOSIS — I45.10 RIGHT BUNDLE BRANCH BLOCK (RBBB): Primary | Chronic | ICD-10-CM

## 2025-03-07 DIAGNOSIS — I95.1 ORTHOSTATIC INTOLERANCE: ICD-10-CM

## 2025-03-07 NOTE — PROGRESS NOTES
"      [facility]    Subjective:     Encounter Date:03/07/25      Patient ID: Darren Pulido is a 50 y.o. male.    Chief Complaint:   Chief Complaint   Patient presents with    Establish Care     Patient is in the office today to establish care for Abnormal Holter.       History of Present Illness    Darren Pulido is a 50 y.o. gentleman with a past medical history of diabetes mellitus, hyperlipidemia, hypertension, who presents for further evaluation of recurrent syncopal episodes with an abnormal Holter monitor.    He reports over the last several years she has had recurrent syncopal episodes, usually in the setting of him feeling hot, or dehydrated.  He had a syncopal episode [facility]  He was not allowed to take his water bottle on it thinks he maybe was a bit dehydrated.  He had another syncopal episode that occurred most recently in January of this year, he woke up at night, and he was ambulating up stairs as he lives in the basement, and he passed out on the stair steps as he was walking up.  He had a little bit of lightheadedness as a prodrome but otherwise no palpitations, chest pain or shortness of breath.    He reports that passing out spell is similar to previous, usually around the setting of positional changes.  He also reports some concomitant shortness of breath which he is wondering if it is \"in his head\" or not usually affects him in the morning.  No overt anginal symptoms but he reports he is not that physically active.    Previous Cardiac Testing:  Holter monitor February 2025:  An abnormal monitor study.    There was one episodes of ventricular tachycardia. 9 beats of ventricular tachycardia on 2/7/2025 at 2:21 AM, heart rate 134 bpm, lasting 4.2 seconds, no symptoms.    Fluttering/racing, Short of breath, choked, cough, Lightheaded, diarrhea, anxiety, Irregular beats was reported during the monitoring period. Fluttering/racing, Short " "of breath, choked, cough, Lightheaded, diarrhea, anxiety, Irregular beats had no correlations.    Echo February 2025:  Left ventricular systolic function is normal. Calculated left ventricular EF = 65%    Left ventricular diastolic function was normal.    The following portions of the patient's history were reviewed and updated as appropriate: allergies, current medications, past family history, past medical history, past social history, past surgical history and problem list.    Past Medical History:   Diagnosis Date    Allergic rhinitis     Anxiety     Anxiety/ Stress    Bundle branch block     Right Bundle Branch Block on EKG    Fatigue     Fatigue/Lethargy    Gastroesophageal reflux     Hiatal hernia     History of EKG 06/21/2010    Insomnia     Low back pain     with sciatic component to right leg    Low back strain     Morbid obesity     Palpitation     Rotator cuff tear, right     Syncopal episodes     x 3 due to overheating at [facility] [employer]    Vasovagal episode     Vasovagal episodes    Weight loss     Intentional weight loss with Nutrasystem       Past Surgical History:   Procedure Laterality Date    APPENDECTOMY  1978    CHOLECYSTECTOMY  08/09/2010    Lap Gallbladder           ECG 12 Lead    Date/Time: 3/7/2025 8:54 AM  Performed by: Darryl Plata MD    Authorized by: Darryl Plata MD  Comparison: compared with previous ECG from 1/24/2025  Similar to previous ECG  Rhythm: sinus rhythm  Rate: normal  Conduction: right bundle branch block  T flattening: V2 and V3  QRS axis: normal  Other findings: non-specific ST-T wave changes             Objective:     Vitals:    03/07/25 0813   BP: 118/78   BP Location: Left arm   Patient Position: Sitting   Cuff Size: Adult   Pulse: 70   SpO2: 97%   Weight: 118 kg (259 lb 12.8 oz)   Height: 177.8 cm (70\")         Constitutional:       Appearance: Healthy appearance. Not in distress.   Neck:      Vascular: JVD normal.   Pulmonary:      Effort: Pulmonary effort is " normal.      Breath sounds: Normal breath sounds.   Cardiovascular:      PMI at left midclavicular line. Normal rate. Regular rhythm. Normal S2.       Murmurs: There is no murmur.   Pulses:     Intact distal pulses.   Edema:     Peripheral edema absent.   Skin:     General: Skin is warm and dry.   Neurological:      General: No focal deficit present.      Mental Status: Alert, oriented to person, place, and time and oriented to person, place and time.   Psychiatric:         Mood and Affect: Mood and affect normal.         Lab Review:     Lipid Panel          3/19/2024    16:12 10/11/2024    16:02   Lipid Panel   Total Cholesterol 163  155    Triglycerides 101  107    HDL Cholesterol 53  42    VLDL Cholesterol 18  20    LDL Cholesterol  92  93      BUN   Date Value Ref Range Status   01/24/2025 13 6 - 20 mg/dL Final     Creatinine   Date Value Ref Range Status   01/24/2025 0.85 0.76 - 1.27 mg/dL Final     Potassium   Date Value Ref Range Status   01/24/2025 4.7 3.5 - 5.2 mmol/L Final     ALT (SGPT)   Date Value Ref Range Status   01/24/2025 18 1 - 41 U/L Final     AST (SGOT)   Date Value Ref Range Status   01/24/2025 15 1 - 40 U/L Final         Performed        Assessment:          Diagnosis Plan   1. Right bundle branch block (RBBB)  Stress Test With Myocardial Perfusion One Day      2. Syncope and collapse  ECG 12 Lead    Stress Test With Myocardial Perfusion One Day      3. Type 2 diabetes mellitus without complication, without long-term current use of insulin        4. NSVT (nonsustained ventricular tachycardia)  Stress Test With Myocardial Perfusion One Day      5. Dyspnea on exertion  Stress Test With Myocardial Perfusion One Day      6. Orthostatic intolerance               Plan:         Recurrent syncope: It sounds that could be orthostatic in nature, however his most recent spell that occurred in the setting of exertion with walking up a flight of stairs, after laying in a supine position.  He has some  chronic [place] exertion which could be an anginal equivalent.  Could remain orthostatic but he did have a NSVT episode on his Holter monitor, and he has risk factors for coronary heart disease including concomitant dyspnea on exertion, diabetes mellitus, hyperlipidemia and hypertension.  Arrange for nuclear perfusion stress test, he reports that he did poorly on a treadmill stress test 10 years ago and wonders if he is going to be able to achieve an adequate stress on the treadmill.  He may ultimately need Chantel scan.  Will try treadmill first  Repeat echo, structurally normal  NSVT: Stress testing per above he has no symptoms from this ultimately he does not require any specific treatments or therapies for this so long as I do not see any significant obstructive CAD on above testing  Sleep testing per below  Hypersomnolence with some nocturnal shortness of breath: His TTE did not have any significant CHF.  He is going to be investigated for YEFRI.  I agree with this  Diabetes mellitus.  He is on Actos.  No signs of overload on exam  Hyperlipidemia.  On appropriate statin therapy.  Hypertension.  He is on losartan.  Clinically his symptoms could be orthostatic.  Losartan may need to be reduced.    Thank you for allowing me to participate in the care of Darren Pulido. Feel free to contact me directly with any further questions or concerns.    RTC 6 months for reassessment.  Stress test in the interim    Darryl Plata MD  [facility]  03/07/25  08:49 EST       Current Outpatient Medications:     Accu-Chek FastClix Lancets misc, Use 1 Units Daily., Disp: 100 each, Rfl: 1    atorvastatin (LIPITOR) 10 MG tablet, TAKE 1 TABLET BY MOUTH EVERY DAY, Disp: 90 tablet, Rfl: 0    Blood Gluc Meter Disp-Strips device, 1 Units Daily., Disp: 1 each, Rfl: 0    escitalopram (Lexapro) 10 MG tablet, Take 1 tablet by mouth Daily., Disp: 30 tablet, Rfl: 1    glucose blood test strip, 1 each by Other route 2 (Two) Times a  Day. Use as instructed, Disp: 100 each, Rfl: 3    glucose monitor monitoring kit, 1 each Daily. Accu-Chek FastClix, Disp: 1 each, Rfl: 0    Lancets 30G misc, 1 Units 2 (Two) Times a Day., Disp: 100 each, Rfl: 1    losartan (COZAAR) 50 MG tablet, TAKE 1 TABLET BY MOUTH EVERY DAY, Disp: 90 tablet, Rfl: 0    metFORMIN ER (GLUCOPHAGE-XR) 500 MG 24 hr tablet, TAKE 4 TABLETS BY MOUTH DAILY WITH BREAKFAST., Disp: 360 tablet, Rfl: 1    pioglitazone (ACTOS) 30 MG tablet, TAKE 1 TABLET BY MOUTH EVERY DAY, Disp: 90 tablet, Rfl: 0         Return in about 6 months (around 9/7/2025).      Part of this note may be an electronic transcription/translation of spoken language to printed text using the Dragon Dictation System.

## 2025-03-09 PROBLEM — F43.21 GRIEF: Status: RESOLVED | Noted: 2024-10-26 | Resolved: 2025-03-09

## 2025-03-09 PROBLEM — R06.83 SNORING: Chronic | Status: ACTIVE | Noted: 2025-03-09

## 2025-03-09 PROBLEM — R06.83 SNORING: Status: ACTIVE | Noted: 2025-03-09

## 2025-03-09 NOTE — PROGRESS NOTES
"Chief Complaint  Syncope (4 week follow up - SOB, lightheaded in the mornings) and Diarrhea  Subjective        Darren Pulido presents to [facility]  Syncope    Diarrhea       History of Present Illness  The patient presents for evaluation of lightheadedness, irregular heart rate, anxiety, hypertension and DM2. He is accompanied by his uncle.    This morning, he experienced an episode of lightheadedness, shortness of breath, and diarrhea, which he attributes to anxiety. He reports no current symptoms of dizziness or lightheadedness. He has not undergone testing for sleep apnea and is uncertain about snoring habits. His sleep pattern is irregular, often waking up at 3 AM without a clear reason. He has been experiencing increased anxiety since the death of his father and is open to pharmacological intervention. He has a history of syncope, with 6 to 7 episodes occurring over the past decade. He recalls an incident of syncope at a music festival years ago, which was attributed to dehydration.     He has been monitoring his blood pressure at home, noting diastolic readings of 77, 78, and 80, and describes a sensation of tachycardia rather than arrhythmia. He believes his blood pressure medication may require adjustment.    SOCIAL HISTORY  The patient moved here from [place] in 2009.    Objective   Vital Signs:  /78 (BP Location: Left arm, Patient Position: Sitting, Cuff Size: Adult)   Pulse 60   Ht 177.8 cm (70\")   Wt 118 kg (260 lb 9.6 oz)   SpO2 95%   BMI 37.39 kg/m²   Estimated body mass index is 37.39 kg/m² as calculated from the following:    Height as of this encounter: 177.8 cm (70\").    Weight as of this encounter: 118 kg (260 lb 9.6 oz).            Physical Exam  Constitutional:       Appearance: He is well-developed. He is not ill-appearing.   HENT:      Head: Normocephalic.      Right Ear: Hearing, tympanic membrane and external ear normal.      Left Ear: Hearing, " tympanic membrane and external ear normal.      Nose: Nose normal. No nasal deformity, mucosal edema or rhinorrhea.      Right Sinus: No maxillary sinus tenderness or frontal sinus tenderness.      Left Sinus: No maxillary sinus tenderness or frontal sinus tenderness.      Mouth/Throat:      Dentition: Normal dentition.   Eyes:      General: Lids are normal.         Right eye: No discharge.         Left eye: No discharge.      Conjunctiva/sclera: Conjunctivae normal.      Right eye: No exudate.     Left eye: No exudate.  Neck:      Thyroid: No thyroid mass or thyromegaly.      Vascular: No carotid bruit.      Trachea: Trachea normal.   Cardiovascular:      Rate and Rhythm: Regular rhythm.      Pulses: Normal pulses.      Heart sounds: Normal heart sounds. No murmur heard.  Pulmonary:      Effort: No respiratory distress.      Breath sounds: Normal breath sounds. No decreased breath sounds, wheezing, rhonchi or rales.   Abdominal:      General: Bowel sounds are normal.      Palpations: Abdomen is soft.      Tenderness: There is no abdominal tenderness.   Musculoskeletal:      Cervical back: Normal range of motion. No edema.   Lymphadenopathy:      Head:      Right side of head: No submental, submandibular, tonsillar, preauricular, posterior auricular or occipital adenopathy.      Left side of head: No submental, submandibular, tonsillar, preauricular, posterior auricular or occipital adenopathy.   Skin:     General: Skin is warm and dry.      Nails: There is no clubbing.   Neurological:      Mental Status: He is alert.   Psychiatric:         Behavior: Behavior is cooperative.        Physical Exam      Vital Signs  Blood pressure is 118/78.    Result Review :  The following data was reviewed by: WADE Black on 02/20/2025:  Common labs          6/21/2024    13:57 10/11/2024    16:02 1/24/2025    09:36   Common Labs   Glucose 128  116  136    BUN 14  14  13    Creatinine 0.75  0.79  0.85    Sodium 138  142   142    Potassium 4.6  4.8  4.7    Chloride 100  102  96    Calcium 9.7  9.9  10.2    Albumin 4.4  4.8  4.5    Total Bilirubin 0.5  0.6  0.6    Alkaline Phosphatase 48  58  56    AST (SGOT) 15  18  15    ALT (SGPT) 26  23  18    WBC  10.86  11.48    Hemoglobin  17.3  16.2    Hematocrit  51.9  48.1    Platelets  333  353    Total Cholesterol  155     Triglycerides  107     HDL Cholesterol  42     LDL Cholesterol   93     Hemoglobin A1C 7.5  6.90  6.90    Microalbumin, Urine <3.0        Data reviewed : Cardiology studies Holter monitor 2/17/25       Results  Laboratory Studies  Hemoglobin A1c was 6.9. Blood sugar was 136. Kidney function and liver enzymes were normal. Blood count was normal. Thyroid was normal.    Testing  Holter monitor showed an irregular heart rate.             Assessment and Plan   Diagnoses and all orders for this visit:    1. Anxiety (Primary)  Assessment & Plan:  He notes increased anxiety with multiple stressors, Lexapro 10 mg has been prescribed to manage his anxiety, with instructions to take it once daily. Will re-evaluate in 8 weeks.    Orders:  -     escitalopram (Lexapro) 10 MG tablet; Take 1 tablet by mouth Daily.  Dispense: 30 tablet; Refill: 1    2. Syncope, unspecified syncope type  Assessment & Plan:  The Holter monitor results indicate an irregular heart rate, which could potentially be a contributing factor to his episodes of syncope. Dehydration may exacerbate this condition. Sleep apnea is also a potential cause of the irregular heart rate. His blood pressure readings have been inconsistent, with a reading of 120/84 during his echocardiogram. His average heart rate was 73. A referral to a cardiologist has been made for further evaluation of the irregular heart rate.      3. Hypertension, essential  Assessment & Plan:  His blood pressure was high initially but rechecked at 118/78. Continue losartan along with regular BP monitoring.      4. Snoring  Assessment & Plan:   A home  sleep study will be initiated to investigate the possibility of sleep apnea.     Orders:  -     Ambulatory Referral to Sleep Medicine    5. Type 2 diabetes mellitus without complication, without long-term current use of insulin  Assessment & Plan:  His hemoglobin A1c was 6.9, which is within the target range. His blood sugar was 136, slightly elevated. He is advised to continue his current diabetes management plan (metformin and pioglitazone).        Assessment & Plan             Follow Up   Return in about 8 weeks (around 4/17/2025).  Patient was given instructions and counseling regarding his condition or for health maintenance advice. Please see specific information pulled into the AVS if appropriate.     Patient or patient representative verbalized consent for the use of Ambient Listening during the visit with  WADE Black for chart documentation. 3/9/2025  12:30 EDT

## 2025-03-09 NOTE — ASSESSMENT & PLAN NOTE
The Holter monitor results indicate an irregular heart rate, which could potentially be a contributing factor to his episodes of syncope. Dehydration may exacerbate this condition. Sleep apnea is also a potential cause of the irregular heart rate. His blood pressure readings have been inconsistent, with a reading of 120/84 during his echocardiogram. His average heart rate was 73. A referral to a cardiologist has been made for further evaluation of the irregular heart rate.

## 2025-03-09 NOTE — ASSESSMENT & PLAN NOTE
His blood pressure was high initially but rechecked at 118/78. Continue losartan along with regular BP monitoring.

## 2025-03-09 NOTE — ASSESSMENT & PLAN NOTE
He notes increased anxiety with multiple stressors, Lexapro 10 mg has been prescribed to manage his anxiety, with instructions to take it once daily. Will re-evaluate in 8 weeks.

## 2025-03-09 NOTE — ASSESSMENT & PLAN NOTE
His hemoglobin A1c was 6.9, which is within the target range. His blood sugar was 136, slightly elevated. He is advised to continue his current diabetes management plan (metformin and pioglitazone).

## 2025-03-13 ENCOUNTER — TELEPHONE (OUTPATIENT)
Dept: CARDIOLOGY | Facility: CLINIC | Age: 50
End: 2025-03-13
Payer: COMMERCIAL

## 2025-03-19 ENCOUNTER — TELEPHONE (OUTPATIENT)
Dept: CARDIOLOGY | Facility: CLINIC | Age: 50
End: 2025-03-19
Payer: COMMERCIAL

## 2025-03-19 DIAGNOSIS — F41.9 ANXIETY: ICD-10-CM

## 2025-03-19 RX ORDER — ESCITALOPRAM OXALATE 10 MG/1
10 TABLET ORAL DAILY
Qty: 30 TABLET | Refills: 1 | Status: SHIPPED | OUTPATIENT
Start: 2025-03-19

## 2025-03-20 ENCOUNTER — HOSPITAL ENCOUNTER (OUTPATIENT)
Dept: CARDIOLOGY | Facility: HOSPITAL | Age: 50
Discharge: HOME | End: 2025-03-20
Admitting: STUDENT IN AN ORGANIZED HEALTH CARE EDUCATION/TRAINING PROGRAM
Payer: COMMERCIAL

## 2025-03-20 VITALS — HEIGHT: 70 IN | BODY MASS INDEX: 37.22 KG/M2 | WEIGHT: 260 LBS

## 2025-03-20 DIAGNOSIS — I45.10 RIGHT BUNDLE BRANCH BLOCK (RBBB): Chronic | ICD-10-CM

## 2025-03-20 DIAGNOSIS — R06.09 DYSPNEA ON EXERTION: ICD-10-CM

## 2025-03-20 DIAGNOSIS — I47.29 NSVT (NONSUSTAINED VENTRICULAR TACHYCARDIA): ICD-10-CM

## 2025-03-20 DIAGNOSIS — R55 SYNCOPE AND COLLAPSE: ICD-10-CM

## 2025-03-20 LAB
BH CV NUCLEAR PRIOR STUDY: 2
BH CV REST NUCLEAR ISOTOPE DOSE: 10.6 MCI
BH CV STRESS BP STAGE 1: NORMAL
BH CV STRESS BP STAGE 2: NORMAL
BH CV STRESS DURATION MIN STAGE 1: 3
BH CV STRESS DURATION MIN STAGE 2: 3
BH CV STRESS DURATION SEC STAGE 1: 0
BH CV STRESS DURATION SEC STAGE 2: 0
BH CV STRESS GRADE STAGE 1: 10
BH CV STRESS GRADE STAGE 2: 12
BH CV STRESS HR STAGE 1: 129
BH CV STRESS HR STAGE 2: 154
BH CV STRESS METS STAGE 1: 5
BH CV STRESS METS STAGE 2: 7.5
BH CV STRESS NUCLEAR ISOTOPE DOSE: 35.8 MCI
BH CV STRESS PROTOCOL 1: NORMAL
BH CV STRESS RECOVERY BP: NORMAL MMHG
BH CV STRESS RECOVERY HR: 94 BPM
BH CV STRESS SPEED STAGE 1: 1.7
BH CV STRESS SPEED STAGE 2: 2.5
BH CV STRESS STAGE 1: 1
BH CV STRESS STAGE 2: 2
MAXIMAL PREDICTED HEART RATE: 170 BPM
PERCENT MAX PREDICTED HR: 90.59 %
SPECT HRT GATED+EF W RNC IV: 59 %
STRESS BASELINE BP: NORMAL MMHG
STRESS BASELINE HR: 71 BPM
STRESS PERCENT HR: 107 %
STRESS POST ESTIMATED WORKLOAD: 7.5 METS
STRESS POST EXERCISE DUR MIN: 6 MIN
STRESS POST EXERCISE DUR SEC: 0 SEC
STRESS POST PEAK BP: NORMAL MMHG
STRESS POST PEAK HR: 154 BPM
STRESS TARGET HR: 145 BPM

## 2025-03-20 PROCEDURE — A9502 TC99M TETROFOSMIN: HCPCS | Performed by: STUDENT IN AN ORGANIZED HEALTH CARE EDUCATION/TRAINING PROGRAM

## 2025-03-20 PROCEDURE — 34310000005 TECHNETIUM TETROFOSMIN KIT: Performed by: STUDENT IN AN ORGANIZED HEALTH CARE EDUCATION/TRAINING PROGRAM

## 2025-03-20 PROCEDURE — 93017 CV STRESS TEST TRACING ONLY: CPT

## 2025-03-20 PROCEDURE — 78452 HT MUSCLE IMAGE SPECT MULT: CPT

## 2025-03-20 RX ADMIN — TETROFOSMIN 1 DOSE: 1.38 INJECTION, POWDER, LYOPHILIZED, FOR SOLUTION INTRAVENOUS at 07:30

## 2025-03-20 RX ADMIN — TETROFOSMIN 1 DOSE: 1.38 INJECTION, POWDER, LYOPHILIZED, FOR SOLUTION INTRAVENOUS at 08:20

## 2025-03-21 ENCOUNTER — OFFICE VISIT (OUTPATIENT)
Dept: SLEEP MEDICINE | Facility: HOSPITAL | Age: 50
End: 2025-03-21
Payer: COMMERCIAL

## 2025-03-21 VITALS — HEART RATE: 85 BPM | HEIGHT: 70 IN | WEIGHT: 260 LBS | BODY MASS INDEX: 37.22 KG/M2 | OXYGEN SATURATION: 95 %

## 2025-03-21 DIAGNOSIS — G47.19 DAYTIME HYPERSOMNOLENCE: ICD-10-CM

## 2025-03-21 DIAGNOSIS — E66.01 CLASS 2 SEVERE OBESITY WITH SERIOUS COMORBIDITY AND BODY MASS INDEX (BMI) OF 37.0 TO 37.9 IN ADULT, UNSPECIFIED OBESITY TYPE: ICD-10-CM

## 2025-03-21 DIAGNOSIS — R29.818 SUSPECTED SLEEP APNEA: Primary | ICD-10-CM

## 2025-03-21 DIAGNOSIS — E66.812 CLASS 2 SEVERE OBESITY WITH SERIOUS COMORBIDITY AND BODY MASS INDEX (BMI) OF 37.0 TO 37.9 IN ADULT, UNSPECIFIED OBESITY TYPE: ICD-10-CM

## 2025-03-21 PROCEDURE — G0463 HOSPITAL OUTPT CLINIC VISIT: HCPCS

## 2025-03-21 NOTE — PROGRESS NOTES
[facility]  [address]  [address]  [place] [address]  Phone   Fax      Darren Pulido  [id number]   1975  50 y.o.  male      Referring Provider and PCP: Marsha Daley APRN    Type of service: Initial Sleep Medicine Consult  Date of service: 3/21/2025          CHIEF COMPLAINT: Suspected sleep apnea      HISTORY OF PRESENT ILLNESS:  Darren Pulido 50 y.o. was seen today on 3/21/2025 at [facility].  Patient has a history of syncope, hyperlipidemia, diabetes, arrhythmia, for which the patient follows with outside providers.  Patient has no history of tonsillectomy, adenoidectomy, nasal surgery, UPPP.  Patient presents today with suspected sleep apnea.  He believes he snores but he lives alone.  He does wake up gasping or choking at times.  Getting 3 to 4 hours of sleep at night but has been able to increase this to 5-6 per night.  We did discuss that most people need 7 to 9 hours per night and recommended trying to increase further.  He may take 1-2 naps during the day.  Reports he was referred here for suspected sleep apnea.  Reports he was told he had irregular heartbeat per heart monitor and had stress test which looked okay.      Lost wife to cancer and lost both parents within last few years.   Denies SI or HI.  Declines referral to behavioral health.   Reports PCP had prescribed Lexapro for anxiety but he has not started this yet.  He felt some improvement with changing his diet making healthier food choices.      SLEEP HISTORY:  Sleep schedule:  Bedtime: 10 to 11 PM  Wake time: Around 4 AM  Time it takes to fall asleep: Unsure  Average hours of sleep: 5-6  Number of naps per day: 1-2    Symptoms:   In addition to the above, patient reports the following associated symptoms:  Have you ever awakened gasping for breath, coughing, choking: Yes   Change in weight:  Yes, lost 30 pounds  Morning headaches:  No   Awaken with a sore throat  "or dry mouth:  Yes   Leg jerking at night:  No   Creepy crawly feeling in legs/urge to move legs: No   Teeth grinding: No   Have you ever awakened at night with a sour taste or burning sensation in your chest:  No   Do you have muscle weakness with laughing or anger:  No   Have you ever felt paralyzed while going to sleep or waking up:  No   Sleepwalking: No   Nightmares: No   Nocturia (urination at night): 1-2 times per night  Memory Problem: No     Medical Conditions (PMH):   Hypertension  Diabetes  Hyperlipidemia  Arrhythmia    Social history:  Do you drive a commercial vehicle:  No   Shift work:  No   Tobacco use: Former, ages [age]  Alcohol use: 0 per week  Caffeinated drinks: 2-4 per day  Drug use: marijuana 2-3 x/ week  Occupation: Insurance     Family History (parents and siblings) (pertaining to sleep medicine):  Depression  Sleep apnea  Obesity    Medications: reviewed    Allergies:  Aspirin      REVIEW OF SYSTEMS:  Pertinent positive symptoms are:  [place] Sleepiness Scale of Total score: 12   Fatigue         PHYSICAL EXAM:  CONSTITUTINONAL:   Vitals:    03/21/25 0923   Pulse: 85   SpO2: 95%   Weight: 118 kg (260 lb)   Height: 177.8 cm (70\")    Body mass index is 37.31 kg/m².   HEAD: atraumatic, normocephalic   THROAT: tonsils are not significant, Mallampati class III  NECK: Neck Circumference: 16.5 inches, trachea is midline  RESPIRATORY SYSTEM: Respirations even, unlabored, normal rate  CARDIOVASULAR SYSTEM: Normal rate  NEUROLOGICAL SYSTEM: Alert and oriented x 3  PSYCHIATRIC SYSTEM: Mood is normal/ appropriate     Office note(s) from care team reviewed. Office note(s) reviewed: 3/2025 cardiology note reviewed, 2/2025 internal medicine note reviewed    Labs/ Test Results Reviewed:  TSH          10/11/2024    16:02 1/24/2025    09:36   TSH   TSH 1.930  1.150       Most Recent A1C          1/24/2025    09:36   HGBA1C Most Recent   Hemoglobin A1C 6.90               ASSESSMENT AND PLAN:   Suspected sleep " apnea: patient's symptoms and physical examination are concerning for possible sleep apnea.   I discussed the signs, symptoms, and pathophysiology of sleep apnea with this patient.  I also discussed the possible complications of untreated sleep apnea including but not limited to potential risk of resistant hypertension, insulin resistance, pulmonary hypertension, atrial fibrillation or other arrhythmias, heart attack, stroke, nonrestorative sleep with hypersomnia which can increase risk for motor vehicle accidents, etc.   Different testing methods including home-based and lab based sleep studies were discussed with this patient.   Based on patient history and physical examination, will proceed with home sleep study.  The order for the sleep study is placed in [facility].  The test will be scheduled after prior authorization has been obtained through patient's insurance.  Discussed overview of treatment options for sleep apnea in the office today including PAP therapy, and treatment/ management will be discussed in more detail with this patient after the test is completed.  All questions were answered to patient's satisfaction.   Excessive daytime sleepiness: [place] Sleepiness Scale of Total score: 12.  There are many causes of daytime sleepiness and/or fatigue.  Rule out sleep apnea as a contributing factor, as above.  Do not drive, operate heavy machinery, or do activities that require high concentration if feeling tired/drowsy.  Severe Obesity: Body mass index is 37.31 kg/m².. Patients who are overweight or obese are at increased risk of sleep apnea/ sleep disordered breathing. Weight reduction and healthy lifestyle are encouraged in overweight/ obese patients as part of a comprehensive approach to sleep apnea treatment.       I have also discussed with the patient the following  Adequate amount of sleep: most people need around 7 to 9 hours of sleep each night        Patient will follow-up after study, 31 to 90 days  after PAP therapy initiated if applicable, or contact the office sooner for questions or concerns. Patient's questions were answered.            Thank you again for asking me to consult on this patient.  Please do not hesitate to call me if you have additional questions or concerns.       Divya Owen DNP, APRN  [facility]

## 2025-03-27 DIAGNOSIS — E11.9 TYPE 2 DIABETES MELLITUS WITHOUT COMPLICATION, WITHOUT LONG-TERM CURRENT USE OF INSULIN: Chronic | ICD-10-CM

## 2025-03-27 RX ORDER — METFORMIN HYDROCHLORIDE 500 MG/1
2000 TABLET, EXTENDED RELEASE ORAL
Qty: 360 TABLET | Refills: 1 | Status: SHIPPED | OUTPATIENT
Start: 2025-03-27

## 2025-04-13 ENCOUNTER — PATIENT MESSAGE (OUTPATIENT)
Dept: INTERNAL MEDICINE | Facility: CLINIC | Age: 50
End: 2025-04-13
Payer: COMMERCIAL

## 2025-04-18 ENCOUNTER — OFFICE VISIT (OUTPATIENT)
Dept: INTERNAL MEDICINE | Facility: CLINIC | Age: 50
End: 2025-04-18
Payer: COMMERCIAL

## 2025-04-18 VITALS
WEIGHT: 261 LBS | BODY MASS INDEX: 37.37 KG/M2 | HEART RATE: 62 BPM | SYSTOLIC BLOOD PRESSURE: 108 MMHG | OXYGEN SATURATION: 96 % | DIASTOLIC BLOOD PRESSURE: 70 MMHG | HEIGHT: 70 IN

## 2025-04-18 DIAGNOSIS — R06.83 SNORING: Chronic | ICD-10-CM

## 2025-04-18 DIAGNOSIS — E11.9 TYPE 2 DIABETES MELLITUS WITHOUT COMPLICATION, WITHOUT LONG-TERM CURRENT USE OF INSULIN: Chronic | ICD-10-CM

## 2025-04-18 DIAGNOSIS — L98.9 SKIN LESION OF NECK: Primary | ICD-10-CM

## 2025-04-18 RX ORDER — MUPIROCIN 20 MG/G
1 OINTMENT TOPICAL 2 TIMES DAILY
Qty: 15 G | Refills: 0 | Status: SHIPPED | OUTPATIENT
Start: 2025-04-18

## 2025-04-22 DIAGNOSIS — E11.9 TYPE 2 DIABETES MELLITUS WITHOUT COMPLICATION, WITHOUT LONG-TERM CURRENT USE OF INSULIN: Chronic | ICD-10-CM

## 2025-04-22 DIAGNOSIS — I10 HYPERTENSION, ESSENTIAL: Chronic | ICD-10-CM

## 2025-04-22 DIAGNOSIS — E78.00 HYPERCHOLESTEREMIA: Chronic | ICD-10-CM

## 2025-04-22 RX ORDER — ATORVASTATIN CALCIUM 10 MG/1
10 TABLET, FILM COATED ORAL DAILY
Qty: 90 TABLET | Refills: 0 | Status: SHIPPED | OUTPATIENT
Start: 2025-04-22

## 2025-04-22 RX ORDER — PIOGLITAZONE 30 MG/1
30 TABLET ORAL DAILY
Qty: 90 TABLET | Refills: 0 | Status: SHIPPED | OUTPATIENT
Start: 2025-04-22

## 2025-04-22 RX ORDER — LOSARTAN POTASSIUM 50 MG/1
50 TABLET ORAL DAILY
Qty: 90 TABLET | Refills: 0 | Status: SHIPPED | OUTPATIENT
Start: 2025-04-22

## 2025-04-25 ENCOUNTER — OFFICE VISIT (OUTPATIENT)
Dept: INTERNAL MEDICINE | Facility: CLINIC | Age: 50
End: 2025-04-25
Payer: COMMERCIAL

## 2025-04-25 ENCOUNTER — LAB (OUTPATIENT)
Facility: HOSPITAL | Age: 50
End: 2025-04-25
Payer: COMMERCIAL

## 2025-04-25 VITALS
HEIGHT: 70 IN | BODY MASS INDEX: 37.39 KG/M2 | HEART RATE: 71 BPM | OXYGEN SATURATION: 97 % | WEIGHT: 261.2 LBS | DIASTOLIC BLOOD PRESSURE: 78 MMHG | SYSTOLIC BLOOD PRESSURE: 124 MMHG

## 2025-04-25 DIAGNOSIS — E11.9 TYPE 2 DIABETES MELLITUS WITHOUT COMPLICATION, WITHOUT LONG-TERM CURRENT USE OF INSULIN: Chronic | ICD-10-CM

## 2025-04-25 DIAGNOSIS — F41.9 ANXIETY: Chronic | ICD-10-CM

## 2025-04-25 DIAGNOSIS — Z12.5 SCREENING FOR PROSTATE CANCER: ICD-10-CM

## 2025-04-25 DIAGNOSIS — Z00.00 PHYSICAL EXAM: Primary | ICD-10-CM

## 2025-04-25 DIAGNOSIS — L98.9 SKIN LESION OF NECK: ICD-10-CM

## 2025-04-25 DIAGNOSIS — E78.00 HYPERCHOLESTEREMIA: Chronic | ICD-10-CM

## 2025-04-25 DIAGNOSIS — I10 HYPERTENSION, ESSENTIAL: Chronic | ICD-10-CM

## 2025-04-25 LAB
ALBUMIN SERPL-MCNC: 4.3 G/DL (ref 3.5–5.2)
ALBUMIN UR-MCNC: <1.2 MG/DL
ALBUMIN/GLOB SERPL: 1.3 G/DL
ALP SERPL-CCNC: 44 U/L (ref 39–117)
ALT SERPL W P-5'-P-CCNC: 17 U/L (ref 1–41)
ANION GAP SERPL CALCULATED.3IONS-SCNC: 12.4 MMOL/L (ref 5–15)
AST SERPL-CCNC: 13 U/L (ref 1–40)
BILIRUB SERPL-MCNC: 0.5 MG/DL (ref 0–1.2)
BUN SERPL-MCNC: 14 MG/DL (ref 6–20)
BUN/CREAT SERPL: 20.9 (ref 7–25)
CALCIUM SPEC-SCNC: 9.5 MG/DL (ref 8.6–10.5)
CHLORIDE SERPL-SCNC: 102 MMOL/L (ref 98–107)
CHOLEST SERPL-MCNC: 137 MG/DL (ref 0–200)
CO2 SERPL-SCNC: 25.6 MMOL/L (ref 22–29)
CREAT SERPL-MCNC: 0.67 MG/DL (ref 0.76–1.27)
CREAT UR-MCNC: 129.8 MG/DL
DEPRECATED RDW RBC AUTO: 41.4 FL (ref 37–54)
EGFRCR SERPLBLD CKD-EPI 2021: 113.7 ML/MIN/1.73
ERYTHROCYTE [DISTWIDTH] IN BLOOD BY AUTOMATED COUNT: 12.8 % (ref 12.3–15.4)
GLOBULIN UR ELPH-MCNC: 3.2 GM/DL
GLUCOSE SERPL-MCNC: 90 MG/DL (ref 65–99)
HBA1C MFR BLD: 6.8 % (ref 4.8–5.6)
HCT VFR BLD AUTO: 47 % (ref 37.5–51)
HDLC SERPL-MCNC: 40 MG/DL (ref 40–60)
HGB BLD-MCNC: 15.7 G/DL (ref 13–17.7)
LDLC SERPL CALC-MCNC: 74 MG/DL (ref 0–100)
LDLC/HDLC SERPL: 1.8 {RATIO}
MCH RBC QN AUTO: 29.6 PG (ref 26.6–33)
MCHC RBC AUTO-ENTMCNC: 33.4 G/DL (ref 31.5–35.7)
MCV RBC AUTO: 88.7 FL (ref 79–97)
MICROALBUMIN/CREAT UR: NORMAL MG/G{CREAT}
PLATELET # BLD AUTO: 330 10*3/MM3 (ref 140–450)
PMV BLD AUTO: 9.8 FL (ref 6–12)
POTASSIUM SERPL-SCNC: 4.3 MMOL/L (ref 3.5–5.2)
PROT SERPL-MCNC: 7.5 G/DL (ref 6–8.5)
PSA SERPL-MCNC: 0.54 NG/ML (ref 0–4)
RBC # BLD AUTO: 5.3 10*6/MM3 (ref 4.14–5.8)
SODIUM SERPL-SCNC: 140 MMOL/L (ref 136–145)
TRIGL SERPL-MCNC: 126 MG/DL (ref 0–150)
TSH SERPL DL<=0.05 MIU/L-ACNC: 2.33 UIU/ML (ref 0.27–4.2)
VLDLC SERPL-MCNC: 23 MG/DL (ref 5–40)
WBC NRBC COR # BLD AUTO: 10.84 10*3/MM3 (ref 3.4–10.8)

## 2025-04-25 PROCEDURE — 83036 HEMOGLOBIN GLYCOSYLATED A1C: CPT | Performed by: NURSE PRACTITIONER

## 2025-04-25 PROCEDURE — G0103 PSA SCREENING: HCPCS | Performed by: NURSE PRACTITIONER

## 2025-04-25 PROCEDURE — 80050 GENERAL HEALTH PANEL: CPT | Performed by: NURSE PRACTITIONER

## 2025-04-25 PROCEDURE — 82570 ASSAY OF URINE CREATININE: CPT | Performed by: NURSE PRACTITIONER

## 2025-04-25 PROCEDURE — 36415 COLL VENOUS BLD VENIPUNCTURE: CPT | Performed by: NURSE PRACTITIONER

## 2025-04-25 PROCEDURE — 82043 UR ALBUMIN QUANTITATIVE: CPT | Performed by: NURSE PRACTITIONER

## 2025-04-25 PROCEDURE — 80061 LIPID PANEL: CPT | Performed by: NURSE PRACTITIONER

## 2025-05-01 PROBLEM — L98.9 SKIN LESION OF NECK: Status: ACTIVE | Noted: 2025-05-01

## 2025-05-01 NOTE — ASSESSMENT & PLAN NOTE
- Sleep study postponed due to work commitments; plans to complete it soon.  - Believes sleep study will help improve sleep quality.

## 2025-05-01 NOTE — PROGRESS NOTES
"Chief Complaint  Rash (On neck for one year)  Subjective        Darren Pulido presents to [facility]  History of Present Illness  History of Present Illness  The patient presents for evaluation of skin issues.    He reports a general improvement in his energy, attributing previous concerns to psychological factors. He has seen Dr. Plata regarding syncopal episodes who conducted a stress test that yielded normal results. He is scheduled for a follow-up visit with Dr. Plata in 6 months. He is not experiencing any additional palpitations or episodes of syncope.    He has rescheduled his sleep study due to work commitments. He anticipates that the sleep study will provide insights into his sleep patterns.    He has been dealing with persistent skin lesions, characterized by non-healing areas. He acknowledges a habit of picking at these areas, which may be contributing to their slow healing. A lesion on his back has been present for approximately one year. He admits to occasionally picking at the lesions during sleep or periods of nervousness.     He has placed on a band-aid on a lingering lesion on her posterior neck. Lopez drainage from lesion, no fever and/or chills.    Objective   Vital Signs:  /70 (BP Location: Left arm, Patient Position: Sitting, Cuff Size: Large Adult)   Pulse 62   Ht 177.8 cm (70\")   Wt 118 kg (261 lb)   SpO2 96%   BMI 37.45 kg/m²   Estimated body mass index is 37.45 kg/m² as calculated from the following:    Height as of this encounter: 177.8 cm (70\").    Weight as of this encounter: 118 kg (261 lb).            Physical Exam  Constitutional:       Appearance: He is well-developed. He is not ill-appearing.   HENT:      Head: Normocephalic.      Right Ear: Hearing, tympanic membrane and external ear normal.      Left Ear: Hearing, tympanic membrane and external ear normal.      Nose: Nose normal. No nasal deformity, mucosal edema or rhinorrhea.      " Right Sinus: No maxillary sinus tenderness or frontal sinus tenderness.      Left Sinus: No maxillary sinus tenderness or frontal sinus tenderness.      Mouth/Throat:      Dentition: Normal dentition.   Eyes:      General: Lids are normal.         Right eye: No discharge.         Left eye: No discharge.      Conjunctiva/sclera: Conjunctivae normal.      Right eye: No exudate.     Left eye: No exudate.  Neck:      Thyroid: No thyroid mass or thyromegaly.      Vascular: No carotid bruit.      Trachea: Trachea normal.   Cardiovascular:      Rate and Rhythm: Regular rhythm.      Pulses: Normal pulses.      Heart sounds: Normal heart sounds. No murmur heard.  Pulmonary:      Effort: No respiratory distress.      Breath sounds: Normal breath sounds. No decreased breath sounds, wheezing, rhonchi or rales.   Abdominal:      General: Bowel sounds are normal.      Palpations: Abdomen is soft.      Tenderness: There is no abdominal tenderness.   Musculoskeletal:      Cervical back: Normal range of motion. No edema.   Lymphadenopathy:      Head:      Right side of head: No submental, submandibular, tonsillar, preauricular, posterior auricular or occipital adenopathy.      Left side of head: No submental, submandibular, tonsillar, preauricular, posterior auricular or occipital adenopathy.   Skin:     General: Skin is warm and dry.      Nails: There is no clubbing.      Comments: Scattered macules on bilateral upper extremities and mid back. There is a lesion with eschar and surrounding erythema in posterior neck, no drainage.   Neurological:      Mental Status: He is alert.   Psychiatric:         Behavior: Behavior is cooperative.            Result Review :  The following data was reviewed by: WADE Black on 04/18/2025:  Common labs          10/11/2024    16:02 1/24/2025    09:36 4/25/2025    12:37   Common Labs   Glucose 116  136  90    BUN 14  13  14    Creatinine 0.79  0.85  0.67    Sodium 142  142  140     Potassium 4.8  4.7  4.3    Chloride 102  96  102    Calcium 9.9  10.2  9.5    Albumin 4.8  4.5  4.3    Total Bilirubin 0.6  0.6  0.5    Alkaline Phosphatase 58  56  44    AST (SGOT) 18  15  13    ALT (SGPT) 23  18  17    WBC 10.86  11.48  10.84    Hemoglobin 17.3  16.2  15.7    Hematocrit 51.9  48.1  47.0    Platelets 333  353  330    Total Cholesterol   137    Total Cholesterol 155      Triglycerides 107   126    HDL Cholesterol 42   40    LDL Cholesterol  93   74    Hemoglobin A1C 6.90  6.90  6.80    Microalbumin, Urine   <1.2    PSA   0.536      Data reviewed : Cardiology studies stress test 3/20/25      Results  Diagnostic Testing   - Stress test: Normal             Assessment and Plan   Diagnoses and all orders for this visit:    1. Skin lesion of neck (Primary)  Assessment & Plan:  - Skin lesions do not exhibit signs of infection but there is acute inflammation.  - Prescription for Bactroban ointment to be applied twice daily; advised to cover sores to prevent picking and allow healing. If topical treatment is ineffective, oral antibiotics may be considered.    Orders:  -     mupirocin (BACTROBAN) 2 % ointment; Apply 1 Application topically to the appropriate area as directed 2 (Two) Times a Day.  Dispense: 15 g; Refill: 0    2. Snoring  Assessment & Plan:  - Sleep study postponed due to work commitments; plans to complete it soon.  - Believes sleep study will help improve sleep quality.      3. Type 2 diabetes mellitus without complication, without long-term current use of insulin  Assessment & Plan:  - Full cardiac workup, including stress test, yielded normal results.  - Reassuring given history of diabetes.  - Scheduled follow-up visit with Dr. Plata in 6 months, which may be canceled if no further issues arise.        Assessment & Plan           Follow Up   Return if symptoms worsen or fail to improve, for Next scheduled follow up.  Patient was given instructions and counseling regarding his condition  or for health maintenance advice. Please see specific information pulled into the AVS if appropriate.     Patient or patient representative verbalized consent for the use of Ambient Listening during the visit with  WADE Black for chart documentation. 5/1/2025  19:14 EDT

## 2025-05-01 NOTE — ASSESSMENT & PLAN NOTE
- Skin lesions do not exhibit signs of infection but there is acute inflammation.  - Prescription for Bactroban ointment to be applied twice daily; advised to cover sores to prevent picking and allow healing. If topical treatment is ineffective, oral antibiotics may be considered.

## 2025-05-01 NOTE — ASSESSMENT & PLAN NOTE
- Full cardiac workup, including stress test, yielded normal results.  - Reassuring given history of diabetes.  - Scheduled follow-up visit with Dr. Plata in 6 months, which may be canceled if no further issues arise.

## 2025-05-10 PROBLEM — L98.9 SKIN LESION OF NECK: Status: RESOLVED | Noted: 2025-05-01 | Resolved: 2025-05-10

## 2025-05-10 NOTE — ASSESSMENT & PLAN NOTE
- The sore on the back has healed.  - Discussed potential for staph infections due to diabetes.  - Advised to use Hibiclens once a week for prevention.  - No need for topical medication currently.

## 2025-05-10 NOTE — ASSESSMENT & PLAN NOTE
- Adjusting diet but not regularly checking blood sugar levels.  - Taking metformin; experiencing bowel irregularities once or twice a week.  - Managing bowel irregularities with Imodium; reassured it's fine as long as it does not cause constipation. Would like to continue metformin despite loose stools.  - Urine sample to be collected to check for proteinuria as part of annual kidney monitoring.

## 2025-05-10 NOTE — ASSESSMENT & PLAN NOTE
- Lexapro available for use as needed but not currently taking.  - Feels reassured knowing heart is okay; anxiety symptoms were situational.  - No current episodes of syncope.  - Anxiety management discussed and deemed stable.

## 2025-05-10 NOTE — PROGRESS NOTES
Andrew Pulido is a 50 y.o. male who is here for a physical exam.    History of Present Illness   History of Present Illness  The patient presents for evaluation of a skin infection, diabetes, anxiety, and health maintenance.    Improvement in the skin infection is reported following the application of Bactroban prescribed during the last visit on 04/11/2025. No discomfort is experienced.     Believes his blood glucose levels  have improved with  dietary modifications, though blood sugar levels have not been monitored. Taking pioglitazone and metformin daily. Bowel irregularities occur once or twice a week, attributed to metformin, and are managed with Imodium without associated constipation. No tingling sensation in the feet is reported, though occasional stabbing pain is noted. No presence of blood in the stool or additional episodes of syncope are reported.    Anxiety is currently under control. Lexapro is available for use as needed but has not taken.    No current allergy symptoms are reported. Zyrtec is available for use as needed but has not been required recently.     No dizziness, lightheadedness, neck pain, or tightness are reported. No heartburn or indigestion is reported. Preparation for ColoGuard testing is underway.    A history of a degenerated disk in the lower back, which required chiropractic intervention, is noted, but the condition has improved.      Past Medical History:   Diagnosis Date    Allergic rhinitis     Anxiety     Anxiety/ Stress    Bundle branch block     Right Bundle Branch Block on EKG    Fatigue     Fatigue/Lethargy    Gastroesophageal reflux     Hiatal hernia     History of EKG 06/21/2010    Insomnia     Low back pain     with sciatic component to right leg    Low back strain     Morbid obesity     Palpitation     Rotator cuff tear, right     Syncopal episodes     x 3 due to overheating at [facility]    Vasovagal episode     Vasovagal episodes    Weight loss      Intentional weight loss with Nutrasystem         Current Outpatient Medications:     Accu-Chek FastClix Lancets misc, Use 1 Units Daily., Disp: 100 each, Rfl: 1    atorvastatin (LIPITOR) 10 MG tablet, TAKE 1 TABLET BY MOUTH EVERY DAY, Disp: 90 tablet, Rfl: 0    Blood Gluc Meter Disp-Strips device, 1 Units Daily., Disp: 1 each, Rfl: 0    escitalopram (Lexapro) 10 MG tablet, Take 1 tablet by mouth Daily., Disp: 30 tablet, Rfl: 1    glucose blood test strip, 1 each by Other route 2 (Two) Times a Day. Use as instructed, Disp: 100 each, Rfl: 3    glucose monitor monitoring kit, 1 each Daily. Accu-Chek FastClix, Disp: 1 each, Rfl: 0    Lancets 30G misc, 1 Units 2 (Two) Times a Day., Disp: 100 each, Rfl: 1    losartan (COZAAR) 50 MG tablet, TAKE 1 TABLET BY MOUTH EVERY DAY, Disp: 90 tablet, Rfl: 0    metFORMIN ER (GLUCOPHAGE-XR) 500 MG 24 hr tablet, TAKE 4 TABLETS BY MOUTH DAILY WITH BREAKFAST., Disp: 360 tablet, Rfl: 1    mupirocin (BACTROBAN) 2 % ointment, Apply 1 Application topically to the appropriate area as directed 2 (Two) Times a Day., Disp: 15 g, Rfl: 0    pioglitazone (ACTOS) 30 MG tablet, TAKE 1 TABLET BY MOUTH EVERY DAY, Disp: 90 tablet, Rfl: 0    Allergies   Allergen Reactions    Aspirin        Review of Systems   Constitutional:  Negative for activity change, appetite change, chills, diaphoresis, fatigue, fever and unexpected weight change.   HENT:  Negative for congestion, dental problem, drooling, ear discharge, ear pain, facial swelling, hearing loss, mouth sores, nosebleeds, postnasal drip, rhinorrhea, sinus pressure, sore throat, tinnitus and trouble swallowing.    Eyes:  Negative for photophobia, pain, discharge, redness, itching and visual disturbance.   Respiratory:  Negative for apnea, cough, choking, chest tightness, shortness of breath and wheezing.    Cardiovascular:  Negative for chest pain, palpitations and leg swelling.        No orthopnea, PND, OBANDO   Gastrointestinal:  Negative for  "abdominal pain, blood in stool, constipation, diarrhea, nausea and vomiting.   Endocrine: Negative for cold intolerance, heat intolerance, polydipsia and polyuria.   Genitourinary:  Negative for decreased urine volume, dysuria, enuresis, flank pain, frequency, hematuria and urgency.   Musculoskeletal:  Positive for arthralgias and back pain. Negative for gait problem, joint swelling, myalgias, neck pain and neck stiffness.   Skin:  Negative for color change and rash.        No hair changes, no nail changes   Allergic/Immunologic: Negative for environmental allergies, food allergies and immunocompromised state.   Neurological:  Negative for dizziness, tremors, seizures, syncope, speech difficulty, weakness, light-headedness, numbness and headaches.   Hematological:  Negative for adenopathy. Does not bruise/bleed easily.   Psychiatric/Behavioral:  Positive for dysphoric mood. Negative for agitation, confusion, decreased concentration, sleep disturbance and suicidal ideas. The patient is nervous/anxious.        Objective   Vitals:    04/25/25 1126   BP: 124/78   BP Location: Right arm   Patient Position: Sitting   Cuff Size: Large Adult   Pulse: 71   SpO2: 97%   Weight: 118 kg (261 lb 3.2 oz)   Height: 177.8 cm (70\")     Physical Exam  Constitutional:       General: He is not in acute distress.     Appearance: Normal appearance. He is not diaphoretic.   HENT:      Head: Normocephalic and atraumatic.      Right Ear: Tympanic membrane, ear canal and external ear normal.      Left Ear: Tympanic membrane, ear canal and external ear normal.      Nose: Nose normal. No rhinorrhea.      Mouth/Throat:      Mouth: Mucous membranes are moist.      Pharynx: Oropharynx is clear.   Eyes:      General:         Right eye: No discharge.         Left eye: No discharge.      Conjunctiva/sclera: Conjunctivae normal.   Cardiovascular:      Rate and Rhythm: Normal rate and regular rhythm.      Pulses: Normal pulses.           Dorsalis pedis " pulses are 2+ on the right side and 2+ on the left side.        Posterior tibial pulses are 2+ on the right side and 2+ on the left side.      Heart sounds: Normal heart sounds.   Pulmonary:      Effort: Pulmonary effort is normal.      Breath sounds: Normal breath sounds.   Abdominal:      General: Bowel sounds are normal.      Tenderness: There is no abdominal tenderness.   Musculoskeletal:         General: No swelling or tenderness.      Cervical back: Normal range of motion.      Right foot: Normal range of motion.      Left foot: Normal range of motion.   Feet:      Right foot:      Protective Sensation: 10 sites tested.  10 sites sensed.      Skin integrity: Skin integrity normal. No ulcer, blister or skin breakdown.      Toenail Condition: Right toenails are normal.      Left foot:      Protective Sensation: 10 sites tested.  10 sites sensed.      Skin integrity: Skin integrity normal. No ulcer, blister or skin breakdown.      Toenail Condition: Left toenails are normal.      Comments: Diabetic Foot Exam Performed and Monofilament Test Performed    Skin:     General: Skin is warm and dry.      Comments: Previously noted skin lesion has dried with minimal inflammation, no drainage.   Neurological:      General: No focal deficit present.      Mental Status: He is alert and oriented to person, place, and time.   Psychiatric:         Mood and Affect: Mood normal.         Behavior: Behavior normal.         Judgment: Judgment normal.           Results         Assessment & Plan   Diagnoses and all orders for this visit:    1. Physical exam (Primary)  -     CBC (No Diff)  -     Comprehensive Metabolic Panel  -     Lipid Panel  -     TSH    2. Type 2 diabetes mellitus without complication, without long-term current use of insulin  Assessment & Plan:  - Adjusting diet but not regularly checking blood sugar levels.  - Taking metformin; experiencing bowel irregularities once or twice a week.  - Managing bowel  irregularities with Imodium; reassured it's fine as long as it does not cause constipation. Would like to continue metformin despite loose stools.  - Urine sample to be collected to check for proteinuria as part of annual kidney monitoring.    Orders:  -     Hemoglobin A1c  -     Microalbumin / Creatinine Urine Ratio - Urine, Clean Catch    3. Hypertension, essential  Assessment & Plan:  - BP stable in office  - Continue losartan daily.      4. Hypercholesteremia  Assessment & Plan:  - Tolerating atorvastatin without myalgias.  - Recheck lipid panel (LDL goal <100)/      5. Anxiety  Assessment & Plan:  - Lexapro available for use as needed but not currently taking.  - Feels reassured knowing heart is okay; anxiety symptoms were situational.  - No current episodes of syncope.  - Anxiety management discussed and deemed stable.      6. Skin lesion of neck  Assessment & Plan:  - The sore on the back has healed.  - Discussed potential for staph infections due to diabetes.  - Advised to use Hibiclens once a week for prevention.  - No need for topical medication currently.      7. Screening for prostate cancer  -     PSA Screen      Assessment & Plan      Risk Assessment:  Family History   Problem Relation Age of Onset    Hyperlipidemia Mother     Hypertension Mother     Diabetes type II Mother     Coronary artery disease Mother         Stent placement x3    Obesity Father     Hypertension Father     Diabetes type II Father     Kidney failure Father         received dialysis    Heart disease Maternal Grandmother     Heart disease Maternal Grandfather     Ulcers Maternal Grandfather     Alzheimer's disease Paternal Grandmother     Bone cancer Paternal Grandfather     Coronary artery disease Maternal Uncle 52    Seizures Paternal Uncle     Other Paternal Uncle         Epilepsy    Depression Other      His Body mass index is 37.48 kg/m². He is trying to make lifestyle changes for improved health/weight  loss.    Prevention:  Health Maintenance   Topic Date Due    Hepatitis B (1 of 3 - 19+ 3-dose series) Never done    Pneumococcal Vaccine 50+ (1 of 2 - PCV) Never done    TDAP/TD VACCINES (2 - Td or Tdap) 01/01/2013    COLORECTAL CANCER SCREENING  Never done    ZOSTER VACCINE (1 of 2) Never done    ANNUAL PHYSICAL  03/19/2025    DIABETIC FOOT EXAM  03/19/2025    URINE MICROALBUMIN-CREATININE RATIO (uACR)  06/21/2025    COVID-19 Vaccine (6 - 2024-25 season) 11/29/2025 (Originally 9/1/2024)    INFLUENZA VACCINE  07/01/2025    HEMOGLOBIN A1C  10/25/2025    DIABETIC EYE EXAM  12/05/2025    LIPID PANEL  04/25/2026    HEPATITIS C SCREENING  Completed       Discussed healthy lifestyle choices such as maintaining a balanced diet low in carbohydrates and limiting caffeine and alcohol intake.  Recommended routine exercise for bone strength and cardiovascular health.         Patient or patient representative verbalized consent for the use of Ambient Listening during the visit with  WADE Black for chart documentation. 5/10/2025  08:08 EDT

## 2025-05-14 ENCOUNTER — PATIENT MESSAGE (OUTPATIENT)
Dept: INTERNAL MEDICINE | Facility: CLINIC | Age: 50
End: 2025-05-14
Payer: COMMERCIAL

## 2025-06-02 ENCOUNTER — OFFICE VISIT (OUTPATIENT)
Dept: INTERNAL MEDICINE | Facility: CLINIC | Age: 50
End: 2025-06-02
Payer: COMMERCIAL

## 2025-06-02 VITALS
OXYGEN SATURATION: 98 % | BODY MASS INDEX: 36.91 KG/M2 | TEMPERATURE: 97.9 F | WEIGHT: 257.8 LBS | HEART RATE: 68 BPM | SYSTOLIC BLOOD PRESSURE: 120 MMHG | DIASTOLIC BLOOD PRESSURE: 74 MMHG | HEIGHT: 70 IN

## 2025-06-02 DIAGNOSIS — R42 DIZZINESS: ICD-10-CM

## 2025-06-02 DIAGNOSIS — L98.9 SKIN LESION OF NECK: ICD-10-CM

## 2025-06-02 DIAGNOSIS — E11.9 TYPE 2 DIABETES MELLITUS WITHOUT COMPLICATION, WITHOUT LONG-TERM CURRENT USE OF INSULIN: Primary | Chronic | ICD-10-CM

## 2025-06-14 PROBLEM — R42 DIZZINESS: Status: ACTIVE | Noted: 2025-06-14

## 2025-06-14 NOTE — PROGRESS NOTES
"Chief Complaint  Diabetes (Elevated sugar readings //Lightheaded - crawled to the fridge and got water, kev D) and GI Problem (Changes in bowel movements)  Subjective        Darren Pulido presents to [facility]  History of Present Illness  History of Present Illness  The patient presents for evaluation of dizziness, diabetes, constipation, and skin irritation.    He experienced sudden dizziness while ascending stairs, necessitating him to sit. He crawled to the refrigerator for water and regained the ability to walk after 30 minutes post-consumption of SunnyD and pudding. Despite a nap, he felt weak upon waking. Blood glucose was 204 around 5-6 PM yesterday, 115 at 5:30 AM today, and 192 postprandially at 1:00 PM. He felt worse yesterday but did not experience syncope, only slight diaphoresis. He is on metformin and pioglitazone but has not been regularly monitoring his blood glucose. He typically has cereal or oatmeal and a banana for breakfast but only had a banana yesterday. He has increased fiber intake due to constipation, consuming more beans.     He has not undergone a sleep study despite referral.    He reported improvement today compared to yesterday. He experienced a burning abdominal sensation a month ago, noticeable but not painful. He had a change in bowel habits with one day of no bowel movement, unusual for him as he typically has 1-2 per day. MiraLAX use for 2 days resulted in a bowel movement on the first day and diarrhea on the second. Since then, with a high-fiber diet and adequate hydration, his bowel movements have normalized.     He c/o intermittent skin lesions on back with excoriation, notes recent exacerbation of sx.    Objective   Vital Signs:  /74 (BP Location: Left arm, Patient Position: Sitting, Cuff Size: Large Adult)   Pulse 68   Temp 97.9 °F (36.6 °C)   Ht 177.8 cm (70\")   Wt 117 kg (257 lb 12.8 oz)   SpO2 98%   BMI 36.99 kg/m² " "  Estimated body mass index is 36.99 kg/m² as calculated from the following:    Height as of this encounter: 177.8 cm (70\").    Weight as of this encounter: 117 kg (257 lb 12.8 oz).            Physical Exam  Constitutional:       Appearance: He is well-developed. He is not ill-appearing.   HENT:      Head: Normocephalic.      Right Ear: Hearing, tympanic membrane and external ear normal.      Left Ear: Hearing, tympanic membrane and external ear normal.      Nose: Nose normal. No nasal deformity, mucosal edema or rhinorrhea.      Right Sinus: No maxillary sinus tenderness or frontal sinus tenderness.      Left Sinus: No maxillary sinus tenderness or frontal sinus tenderness.      Mouth/Throat:      Dentition: Normal dentition.   Eyes:      General: Lids are normal.         Right eye: No discharge.         Left eye: No discharge.      Conjunctiva/sclera: Conjunctivae normal.      Right eye: No exudate.     Left eye: No exudate.  Neck:      Thyroid: No thyroid mass or thyromegaly.      Vascular: No carotid bruit.      Trachea: Trachea normal.   Cardiovascular:      Rate and Rhythm: Regular rhythm.      Pulses: Normal pulses.      Heart sounds: Normal heart sounds. No murmur heard.  Pulmonary:      Effort: No respiratory distress.      Breath sounds: Normal breath sounds. No decreased breath sounds, wheezing, rhonchi or rales.   Abdominal:      General: Bowel sounds are normal.      Palpations: Abdomen is soft.      Tenderness: There is no abdominal tenderness.   Musculoskeletal:      Cervical back: Normal range of motion. No edema.   Lymphadenopathy:      Head:      Right side of head: No submental, submandibular, tonsillar, preauricular, posterior auricular or occipital adenopathy.      Left side of head: No submental, submandibular, tonsillar, preauricular, posterior auricular or occipital adenopathy.   Skin:     General: Skin is warm and dry.      Nails: There is no clubbing.      Comments: Scattered papules on " upper back with excoriation but no drainage and minimal surrounding erythema   Neurological:      Mental Status: He is alert.   Psychiatric:         Behavior: Behavior is cooperative.            Result Review :  The following data was reviewed by: WADE Black on 06/02/2025:  Common labs          10/11/2024    16:02 1/24/2025    09:36 4/25/2025    12:37   Common Labs   Glucose 116  136  90    BUN 14  13  14    Creatinine 0.79  0.85  0.67    Sodium 142  142  140    Potassium 4.8  4.7  4.3    Chloride 102  96  102    Calcium 9.9  10.2  9.5    Albumin 4.8  4.5  4.3    Total Bilirubin 0.6  0.6  0.5    Alkaline Phosphatase 58  56  44    AST (SGOT) 18  15  13    ALT (SGPT) 23  18  17    WBC 10.86  11.48  10.84    Hemoglobin 17.3  16.2  15.7    Hematocrit 51.9  48.1  47.0    Platelets 333  353  330    Total Cholesterol   137    Total Cholesterol 155      Triglycerides 107   126    HDL Cholesterol 42   40    LDL Cholesterol  93   74    Hemoglobin A1C 6.90  6.90  6.80    Microalbumin, Urine   <1.2    PSA   0.536      Data reviewed : Cardiology studies stress test 3/20/25     Results  Blood sugar: 204 at 5-6 PM yesterday, 115 in the morning, 192 postprandially at 1 PM. Hemoglobin: 15.7. Hematocrit: 47.             Assessment and Plan   Diagnoses and all orders for this visit:    1. Type 2 diabetes mellitus without complication, without long-term current use of insulin (Primary)  Assessment & Plan:  Elevated blood glucose (204 mg/dL afternoon, 192 mg/dL postprandially). Increase protein, reduce carbs. Monitor blood glucose AM and 2 hours postprandially after largest meal. No medication changes.      2. Skin lesion of neck  Assessment & Plan:   Likely from scratching, not cellulitis. Apply Bactroban cream and avoid scratching.        3. Dizziness  Assessment & Plan:  Likely due to hypotension, dehydration, or anxiety. Cardiac health satisfactory but with heart disease risk factors. Elevated blood glucose may  contribute. Increase protein, reduce carbs. Monitor blood glucose AM and 2 hours postprandially after largest meal. Report persistent dizziness.        Advised to complete ColoGuard test for colon cancer screening.  Assessment & Plan           Follow Up   No follow-ups on file.  Patient was given instructions and counseling regarding his condition or for health maintenance advice. Please see specific information pulled into the AVS if appropriate.     Patient or patient representative verbalized consent for the use of Ambient Listening during the visit with  WADE Black for chart documentation. 6/14/2025  09:04 EDT

## 2025-06-14 NOTE — ASSESSMENT & PLAN NOTE
Likely due to hypotension, dehydration, or anxiety. Cardiac health satisfactory but with heart disease risk factors. Elevated blood glucose may contribute. Increase protein, reduce carbs. Monitor blood glucose AM and 2 hours postprandially after largest meal. Report persistent dizziness.

## 2025-06-14 NOTE — ASSESSMENT & PLAN NOTE
Elevated blood glucose (204 mg/dL afternoon, 192 mg/dL postprandially). Increase protein, reduce carbs. Monitor blood glucose AM and 2 hours postprandially after largest meal. No medication changes.

## 2025-07-21 DIAGNOSIS — E11.9 TYPE 2 DIABETES MELLITUS WITHOUT COMPLICATION, WITHOUT LONG-TERM CURRENT USE OF INSULIN: Chronic | ICD-10-CM

## 2025-07-21 DIAGNOSIS — I10 HYPERTENSION, ESSENTIAL: Chronic | ICD-10-CM

## 2025-07-21 DIAGNOSIS — E78.00 HYPERCHOLESTEREMIA: Chronic | ICD-10-CM

## 2025-07-21 RX ORDER — LOSARTAN POTASSIUM 50 MG/1
50 TABLET ORAL DAILY
Qty: 90 TABLET | Refills: 0 | Status: SHIPPED | OUTPATIENT
Start: 2025-07-21

## 2025-07-21 RX ORDER — ATORVASTATIN CALCIUM 10 MG/1
10 TABLET, FILM COATED ORAL DAILY
Qty: 90 TABLET | Refills: 0 | Status: SHIPPED | OUTPATIENT
Start: 2025-07-21

## 2025-07-21 RX ORDER — PIOGLITAZONE 30 MG/1
30 TABLET ORAL DAILY
Qty: 90 TABLET | Refills: 0 | Status: SHIPPED | OUTPATIENT
Start: 2025-07-21

## 2025-08-07 ENCOUNTER — PATIENT MESSAGE (OUTPATIENT)
Dept: INTERNAL MEDICINE | Facility: CLINIC | Age: 50
End: 2025-08-07
Payer: COMMERCIAL

## 2025-08-08 DIAGNOSIS — E11.9 TYPE 2 DIABETES MELLITUS WITHOUT COMPLICATION, WITHOUT LONG-TERM CURRENT USE OF INSULIN: Primary | Chronic | ICD-10-CM

## 2025-08-11 DIAGNOSIS — Z12.11 SCREENING FOR COLON CANCER: Primary | ICD-10-CM
